# Patient Record
Sex: FEMALE | Race: WHITE | Employment: OTHER
[De-identification: names, ages, dates, MRNs, and addresses within clinical notes are randomized per-mention and may not be internally consistent; named-entity substitution may affect disease eponyms.]

---

## 2017-01-04 ENCOUNTER — OFFICE VISIT (OUTPATIENT)
Dept: WOUND CARE | Facility: CLINIC | Age: 67
End: 2017-01-04

## 2017-01-04 DIAGNOSIS — L03.115 CELLULITIS OF LEG WITHOUT FOOT, RIGHT: Primary | ICD-10-CM

## 2017-01-04 DIAGNOSIS — L49.1 DERMATITIS, EXFOLIATIVE FROM ERYTHEMATOUS CONDITION ON 10-19% BODY SURFACE: ICD-10-CM

## 2017-01-04 PROCEDURE — 99213 OFFICE O/P EST LOW 20 MIN: CPT | Performed by: PODIATRIST

## 2017-01-12 ENCOUNTER — TELEPHONE (OUTPATIENT)
Dept: WOUND CARE | Facility: CLINIC | Age: 67
End: 2017-01-12

## 2017-03-21 ENCOUNTER — HOSPITAL ENCOUNTER (OUTPATIENT)
Age: 67
Setting detail: SPECIMEN
Discharge: HOME | DRG: 603 | End: 2017-03-21
Payer: MEDICARE

## 2017-03-21 LAB
ALBUMIN SERPL-MCNC: 2.9 G/DL (ref 3.5–5.2)
ALBUMIN/GLOBULIN RATIO: 0.9 (ref 1–2.5)
ALP BLD-CCNC: 64 U/L (ref 35–104)
ALT SERPL-CCNC: 18 U/L (ref 5–33)
ANION GAP SERPL CALCULATED.3IONS-SCNC: 8 MMOL/L (ref 9–17)
AST SERPL-CCNC: 17 U/L
BILIRUB SERPL-MCNC: <0.1 MG/DL (ref 0.3–1.2)
BUN BLDV-MCNC: 27 MG/DL (ref 8–23)
BUN/CREAT BLD: 18 (ref 9–20)
CALCIUM SERPL-MCNC: 8.8 MG/DL (ref 8.6–10.4)
CHLORIDE BLD-SCNC: 104 MMOL/L (ref 98–107)
CO2: 29 MMOL/L (ref 20–31)
CREAT SERPL-MCNC: 1.49 MG/DL (ref 0.5–0.9)
GFR AFRICAN AMERICAN: 42 ML/MIN
GFR NON-AFRICAN AMERICAN: 35 ML/MIN
GFR SERPL CREATININE-BSD FRML MDRD: ABNORMAL ML/MIN/{1.73_M2}
GFR SERPL CREATININE-BSD FRML MDRD: ABNORMAL ML/MIN/{1.73_M2}
GLUCOSE BLD-MCNC: 101 MG/DL (ref 70–99)
POTASSIUM SERPL-SCNC: 5.2 MMOL/L (ref 3.7–5.3)
SODIUM BLD-SCNC: 141 MMOL/L (ref 135–144)
TOTAL PROTEIN: 6.2 G/DL (ref 6.4–8.3)

## 2017-03-21 PROCEDURE — 36415 COLL VENOUS BLD VENIPUNCTURE: CPT

## 2017-03-21 PROCEDURE — 80053 COMPREHEN METABOLIC PANEL: CPT

## 2017-03-21 PROCEDURE — P9604 ONE-WAY ALLOW PRORATED TRIP: HCPCS

## 2017-03-22 ENCOUNTER — HOSPITAL ENCOUNTER (OUTPATIENT)
Age: 67
Setting detail: SPECIMEN
Discharge: HOME | DRG: 603 | End: 2017-03-22
Payer: MEDICARE

## 2017-03-22 LAB
ANION GAP SERPL CALCULATED.3IONS-SCNC: 11 MMOL/L (ref 9–17)
BUN BLDV-MCNC: 26 MG/DL (ref 8–23)
BUN/CREAT BLD: 19 (ref 9–20)
CALCIUM SERPL-MCNC: 9.1 MG/DL (ref 8.6–10.4)
CHLORIDE BLD-SCNC: 99 MMOL/L (ref 98–107)
CO2: 28 MMOL/L (ref 20–31)
CREAT SERPL-MCNC: 1.38 MG/DL (ref 0.5–0.9)
GFR AFRICAN AMERICAN: 46 ML/MIN
GFR NON-AFRICAN AMERICAN: 38 ML/MIN
GFR SERPL CREATININE-BSD FRML MDRD: ABNORMAL ML/MIN/{1.73_M2}
GFR SERPL CREATININE-BSD FRML MDRD: ABNORMAL ML/MIN/{1.73_M2}
GLUCOSE BLD-MCNC: 106 MG/DL (ref 70–99)
POTASSIUM SERPL-SCNC: 5.4 MMOL/L (ref 3.7–5.3)
SODIUM BLD-SCNC: 138 MMOL/L (ref 135–144)

## 2017-03-22 PROCEDURE — 80048 BASIC METABOLIC PNL TOTAL CA: CPT

## 2017-03-22 PROCEDURE — P9604 ONE-WAY ALLOW PRORATED TRIP: HCPCS

## 2017-03-22 PROCEDURE — 36415 COLL VENOUS BLD VENIPUNCTURE: CPT

## 2017-03-23 ENCOUNTER — HOSPITAL ENCOUNTER (INPATIENT)
Age: 67
LOS: 4 days | Discharge: NURSING, REHAB OR LONG-TERM CARE | DRG: 603 | End: 2017-03-27
Attending: INTERNAL MEDICINE | Admitting: INTERNAL MEDICINE
Payer: MEDICARE

## 2017-03-23 PROBLEM — L03.115 CELLULITIS OF RIGHT LEG: Status: ACTIVE | Noted: 2017-03-23

## 2017-03-23 PROCEDURE — 1200000000 HC SEMI PRIVATE

## 2017-03-23 PROCEDURE — 87077 CULTURE AEROBIC IDENTIFY: CPT

## 2017-03-23 PROCEDURE — 6360000002 HC RX W HCPCS: Performed by: INTERNAL MEDICINE

## 2017-03-23 PROCEDURE — 6370000000 HC RX 637 (ALT 250 FOR IP): Performed by: INTERNAL MEDICINE

## 2017-03-23 PROCEDURE — 2500000003 HC RX 250 WO HCPCS: Performed by: INTERNAL MEDICINE

## 2017-03-23 PROCEDURE — 2580000003 HC RX 258: Performed by: INTERNAL MEDICINE

## 2017-03-23 PROCEDURE — 87186 SC STD MICRODIL/AGAR DIL: CPT

## 2017-03-23 PROCEDURE — 87070 CULTURE OTHR SPECIMN AEROBIC: CPT

## 2017-03-23 PROCEDURE — 86403 PARTICLE AGGLUT ANTBDY SCRN: CPT

## 2017-03-23 PROCEDURE — 36415 COLL VENOUS BLD VENIPUNCTURE: CPT

## 2017-03-23 PROCEDURE — 87205 SMEAR GRAM STAIN: CPT

## 2017-03-23 PROCEDURE — 87040 BLOOD CULTURE FOR BACTERIA: CPT

## 2017-03-23 RX ORDER — DEXTROMETHORPHAN POLISTIREX 30 MG/5ML
60 SUSPENSION ORAL 2 TIMES DAILY PRN
Status: DISCONTINUED | OUTPATIENT
Start: 2017-03-23 | End: 2017-03-27 | Stop reason: HOSPADM

## 2017-03-23 RX ORDER — CARVEDILOL 25 MG/1
25 TABLET ORAL 2 TIMES DAILY WITH MEALS
Status: DISCONTINUED | OUTPATIENT
Start: 2017-03-23 | End: 2017-03-27 | Stop reason: HOSPADM

## 2017-03-23 RX ORDER — MELOXICAM 7.5 MG/1
7.5 TABLET ORAL DAILY
Status: DISCONTINUED | OUTPATIENT
Start: 2017-03-24 | End: 2017-03-27 | Stop reason: HOSPADM

## 2017-03-23 RX ORDER — SODIUM PHOSPHATE, DIBASIC AND SODIUM PHOSPHATE, MONOBASIC 7; 19 G/133ML; G/133ML
1 ENEMA RECTAL
COMMUNITY
End: 2020-10-07

## 2017-03-23 RX ORDER — POTASSIUM CHLORIDE 750 MG/1
10 TABLET, FILM COATED, EXTENDED RELEASE ORAL 2 TIMES DAILY
Status: DISCONTINUED | OUTPATIENT
Start: 2017-03-24 | End: 2017-03-27 | Stop reason: HOSPADM

## 2017-03-23 RX ORDER — BISACODYL 10 MG
10 SUPPOSITORY, RECTAL RECTAL DAILY PRN
Status: DISCONTINUED | OUTPATIENT
Start: 2017-03-23 | End: 2017-03-27 | Stop reason: HOSPADM

## 2017-03-23 RX ORDER — OYSTER SHELL CALCIUM WITH VITAMIN D 500; 200 MG/1; [IU]/1
1 TABLET, FILM COATED ORAL DAILY
Status: DISCONTINUED | OUTPATIENT
Start: 2017-03-24 | End: 2017-03-27 | Stop reason: HOSPADM

## 2017-03-23 RX ORDER — ACETAMINOPHEN 325 MG/1
650 TABLET ORAL EVERY 4 HOURS PRN
Status: DISCONTINUED | OUTPATIENT
Start: 2017-03-23 | End: 2017-03-27 | Stop reason: HOSPADM

## 2017-03-23 RX ORDER — OLANZAPINE 2.5 MG/1
2.5 TABLET ORAL
Status: DISCONTINUED | OUTPATIENT
Start: 2017-03-23 | End: 2017-03-24

## 2017-03-23 RX ORDER — SIMETHICONE 80 MG
80 TABLET,CHEWABLE ORAL 2 TIMES DAILY
COMMUNITY

## 2017-03-23 RX ORDER — ONDANSETRON 2 MG/ML
4 INJECTION INTRAMUSCULAR; INTRAVENOUS EVERY 6 HOURS PRN
Status: DISCONTINUED | OUTPATIENT
Start: 2017-03-23 | End: 2017-03-27 | Stop reason: HOSPADM

## 2017-03-23 RX ORDER — SODIUM CHLORIDE 0.9 % (FLUSH) 0.9 %
10 SYRINGE (ML) INJECTION PRN
Status: DISCONTINUED | OUTPATIENT
Start: 2017-03-23 | End: 2017-03-27 | Stop reason: HOSPADM

## 2017-03-23 RX ORDER — FAMOTIDINE 20 MG/1
20 TABLET, FILM COATED ORAL 2 TIMES DAILY
Status: DISCONTINUED | OUTPATIENT
Start: 2017-03-23 | End: 2017-03-27 | Stop reason: HOSPADM

## 2017-03-23 RX ORDER — SODIUM CHLORIDE 9 MG/ML
INJECTION, SOLUTION INTRAVENOUS CONTINUOUS
Status: DISCONTINUED | OUTPATIENT
Start: 2017-03-23 | End: 2017-03-25

## 2017-03-23 RX ORDER — CARBAMAZEPINE 200 MG/1
200 TABLET ORAL DAILY
Status: DISCONTINUED | OUTPATIENT
Start: 2017-03-24 | End: 2017-03-27 | Stop reason: HOSPADM

## 2017-03-23 RX ORDER — CETIRIZINE HYDROCHLORIDE 10 MG/1
5 TABLET ORAL DAILY
Status: DISCONTINUED | OUTPATIENT
Start: 2017-03-24 | End: 2017-03-26

## 2017-03-23 RX ORDER — DIPHENHYDRAMINE HCL 25 MG
25 TABLET ORAL NIGHTLY PRN
COMMUNITY

## 2017-03-23 RX ORDER — ACETAMINOPHEN 325 MG/1
650 TABLET ORAL 2 TIMES DAILY
COMMUNITY

## 2017-03-23 RX ORDER — LAMOTRIGINE 25 MG/1
75 TABLET ORAL DAILY
Status: DISCONTINUED | OUTPATIENT
Start: 2017-03-24 | End: 2017-03-27 | Stop reason: HOSPADM

## 2017-03-23 RX ORDER — ATORVASTATIN CALCIUM 20 MG/1
20 TABLET, FILM COATED ORAL DAILY
Status: DISCONTINUED | OUTPATIENT
Start: 2017-03-24 | End: 2017-03-24

## 2017-03-23 RX ORDER — AMLODIPINE BESYLATE 10 MG/1
10 TABLET ORAL DAILY
Status: DISCONTINUED | OUTPATIENT
Start: 2017-03-24 | End: 2017-03-27 | Stop reason: HOSPADM

## 2017-03-23 RX ORDER — SULFAMETHOXAZOLE AND TRIMETHOPRIM 800; 160 MG/1; MG/1
1 TABLET ORAL 2 TIMES DAILY
Status: ON HOLD | COMMUNITY
End: 2017-03-27 | Stop reason: HOSPADM

## 2017-03-23 RX ORDER — SODIUM CHLORIDE 0.9 % (FLUSH) 0.9 %
10 SYRINGE (ML) INJECTION EVERY 12 HOURS SCHEDULED
Status: DISCONTINUED | OUTPATIENT
Start: 2017-03-23 | End: 2017-03-27 | Stop reason: HOSPADM

## 2017-03-23 RX ORDER — CARBAMAZEPINE 100 MG/1
100 TABLET, CHEWABLE ORAL DAILY
Status: DISCONTINUED | OUTPATIENT
Start: 2017-03-24 | End: 2017-03-24

## 2017-03-23 RX ORDER — LAMOTRIGINE 100 MG/1
100 TABLET ORAL DAILY
Status: DISCONTINUED | OUTPATIENT
Start: 2017-03-24 | End: 2017-03-24

## 2017-03-23 RX ORDER — LEVOTHYROXINE SODIUM 0.15 MG/1
150 TABLET ORAL DAILY
Status: DISCONTINUED | OUTPATIENT
Start: 2017-03-24 | End: 2017-03-27 | Stop reason: HOSPADM

## 2017-03-23 RX ORDER — LISINOPRIL AND HYDROCHLOROTHIAZIDE 20; 12.5 MG/1; MG/1
2 TABLET ORAL DAILY
Status: DISCONTINUED | OUTPATIENT
Start: 2017-03-24 | End: 2017-03-27 | Stop reason: HOSPADM

## 2017-03-23 RX ORDER — VENLAFAXINE 75 MG/1
75 TABLET ORAL DAILY
Status: DISCONTINUED | OUTPATIENT
Start: 2017-03-24 | End: 2017-03-27 | Stop reason: HOSPADM

## 2017-03-23 RX ORDER — ACETAMINOPHEN 500 MG
1000 TABLET ORAL EVERY 6 HOURS PRN
Status: ON HOLD | COMMUNITY
End: 2017-03-27 | Stop reason: HOSPADM

## 2017-03-23 RX ORDER — CLINDAMYCIN PHOSPHATE 600 MG/50ML
600 INJECTION INTRAVENOUS EVERY 8 HOURS
Status: DISCONTINUED | OUTPATIENT
Start: 2017-03-23 | End: 2017-03-26

## 2017-03-23 RX ORDER — FUROSEMIDE 20 MG/1
20 TABLET ORAL EVERY OTHER DAY
Status: DISCONTINUED | OUTPATIENT
Start: 2017-03-24 | End: 2017-03-27 | Stop reason: HOSPADM

## 2017-03-23 RX ADMIN — ACETAMINOPHEN 650 MG: 325 TABLET, FILM COATED ORAL at 19:05

## 2017-03-23 RX ADMIN — VANCOMYCIN HYDROCHLORIDE 1500 MG: 1 INJECTION, POWDER, LYOPHILIZED, FOR SOLUTION INTRAVENOUS at 23:50

## 2017-03-23 RX ADMIN — ACETAMINOPHEN 650 MG: 325 TABLET, FILM COATED ORAL at 22:57

## 2017-03-23 RX ADMIN — ENOXAPARIN SODIUM 40 MG: 40 INJECTION SUBCUTANEOUS at 16:45

## 2017-03-23 RX ADMIN — FAMOTIDINE 20 MG: 20 TABLET ORAL at 21:00

## 2017-03-23 RX ADMIN — CLINDAMYCIN PHOSPHATE 600 MG: 12 INJECTION, SOLUTION INTRAMUSCULAR; INTRAVENOUS at 19:07

## 2017-03-23 RX ADMIN — VANCOMYCIN HYDROCHLORIDE 1500 MG: 1 INJECTION, POWDER, LYOPHILIZED, FOR SOLUTION INTRAVENOUS at 16:44

## 2017-03-23 RX ADMIN — CARVEDILOL 25 MG: 25 TABLET, FILM COATED ORAL at 16:45

## 2017-03-23 RX ADMIN — SODIUM CHLORIDE: 9 INJECTION, SOLUTION INTRAVENOUS at 16:42

## 2017-03-23 ASSESSMENT — PAIN SCALES - GENERAL
PAINLEVEL_OUTOF10: 8
PAINLEVEL_OUTOF10: 3
PAINLEVEL_OUTOF10: 7

## 2017-03-23 ASSESSMENT — PAIN DESCRIPTION - LOCATION
LOCATION: HIP
LOCATION: HIP

## 2017-03-23 ASSESSMENT — PAIN DESCRIPTION - DESCRIPTORS
DESCRIPTORS: ACHING
DESCRIPTORS: ACHING

## 2017-03-23 ASSESSMENT — PAIN DESCRIPTION - ORIENTATION
ORIENTATION: RIGHT
ORIENTATION: RIGHT

## 2017-03-23 ASSESSMENT — PAIN DESCRIPTION - FREQUENCY: FREQUENCY: CONTINUOUS

## 2017-03-23 ASSESSMENT — PAIN DESCRIPTION - PAIN TYPE: TYPE: ACUTE PAIN

## 2017-03-24 LAB
ABSOLUTE EOS #: 0.3 K/UL (ref 0–0.4)
ABSOLUTE LYMPH #: 1 K/UL (ref 1–4.8)
ABSOLUTE MONO #: 0.7 K/UL (ref 0–1)
ANION GAP SERPL CALCULATED.3IONS-SCNC: 11 MMOL/L (ref 9–17)
BASOPHILS # BLD: 0 % (ref 0–2)
BASOPHILS ABSOLUTE: 0 K/UL (ref 0–0.2)
BUN BLDV-MCNC: 31 MG/DL (ref 8–23)
BUN/CREAT BLD: 22 (ref 9–20)
CALCIUM SERPL-MCNC: 8.9 MG/DL (ref 8.6–10.4)
CHLORIDE BLD-SCNC: 102 MMOL/L (ref 98–107)
CO2: 26 MMOL/L (ref 20–31)
CREAT SERPL-MCNC: 1.38 MG/DL (ref 0.5–0.9)
DIFFERENTIAL TYPE: ABNORMAL
EOSINOPHILS RELATIVE PERCENT: 4 % (ref 0–8)
GFR AFRICAN AMERICAN: 46 ML/MIN
GFR NON-AFRICAN AMERICAN: 38 ML/MIN
GFR SERPL CREATININE-BSD FRML MDRD: ABNORMAL ML/MIN/{1.73_M2}
GFR SERPL CREATININE-BSD FRML MDRD: ABNORMAL ML/MIN/{1.73_M2}
GLUCOSE BLD-MCNC: 116 MG/DL (ref 70–99)
HCT VFR BLD CALC: 27.6 % (ref 36–46)
HEMOGLOBIN: 9 G/DL (ref 12–16)
LYMPHOCYTES # BLD: 12 % (ref 24–44)
MCH RBC QN AUTO: 31.6 PG (ref 26–34)
MCHC RBC AUTO-ENTMCNC: 32.7 G/DL (ref 31–37)
MCV RBC AUTO: 96.5 FL (ref 80–100)
MONOCYTES # BLD: 9 % (ref 0–12)
PDW BLD-RTO: 14.5 % (ref 12.1–15.2)
PLATELET # BLD: 320 K/UL (ref 140–450)
PLATELET ESTIMATE: ABNORMAL
PMV BLD AUTO: 8.4 FL (ref 6–12)
POTASSIUM SERPL-SCNC: 4.8 MMOL/L (ref 3.7–5.3)
RBC # BLD: 2.86 M/UL (ref 4–5.2)
RBC # BLD: ABNORMAL 10*6/UL
SEG NEUTROPHILS: 75 % (ref 36–66)
SEGMENTED NEUTROPHILS ABSOLUTE COUNT: 6.3 K/UL (ref 1.8–7.7)
SODIUM BLD-SCNC: 139 MMOL/L (ref 135–144)
WBC # BLD: 8.4 K/UL (ref 3.5–11)
WBC # BLD: ABNORMAL 10*3/UL

## 2017-03-24 PROCEDURE — 6370000000 HC RX 637 (ALT 250 FOR IP): Performed by: INTERNAL MEDICINE

## 2017-03-24 PROCEDURE — 85025 COMPLETE CBC W/AUTO DIFF WBC: CPT

## 2017-03-24 PROCEDURE — 80048 BASIC METABOLIC PNL TOTAL CA: CPT

## 2017-03-24 PROCEDURE — 2580000003 HC RX 258: Performed by: INTERNAL MEDICINE

## 2017-03-24 PROCEDURE — 6360000002 HC RX W HCPCS: Performed by: INTERNAL MEDICINE

## 2017-03-24 PROCEDURE — 1200000000 HC SEMI PRIVATE

## 2017-03-24 PROCEDURE — 36415 COLL VENOUS BLD VENIPUNCTURE: CPT

## 2017-03-24 PROCEDURE — 2500000003 HC RX 250 WO HCPCS: Performed by: INTERNAL MEDICINE

## 2017-03-24 RX ORDER — VENLAFAXINE 37.5 MG/1
37.5 TABLET ORAL NIGHTLY
Status: DISCONTINUED | OUTPATIENT
Start: 2017-03-24 | End: 2017-03-27 | Stop reason: HOSPADM

## 2017-03-24 RX ORDER — ATORVASTATIN CALCIUM 20 MG/1
20 TABLET, FILM COATED ORAL NIGHTLY
Status: DISCONTINUED | OUTPATIENT
Start: 2017-03-24 | End: 2017-03-27 | Stop reason: HOSPADM

## 2017-03-24 RX ORDER — CARBAMAZEPINE 100 MG/1
100 TABLET, CHEWABLE ORAL NIGHTLY
Status: DISCONTINUED | OUTPATIENT
Start: 2017-03-24 | End: 2017-03-27 | Stop reason: HOSPADM

## 2017-03-24 RX ORDER — LAMOTRIGINE 100 MG/1
100 TABLET ORAL NIGHTLY
Status: DISCONTINUED | OUTPATIENT
Start: 2017-03-24 | End: 2017-03-27 | Stop reason: HOSPADM

## 2017-03-24 RX ORDER — OLANZAPINE 2.5 MG/1
2.5 TABLET ORAL
Status: DISCONTINUED | OUTPATIENT
Start: 2017-03-24 | End: 2017-03-27 | Stop reason: HOSPADM

## 2017-03-24 RX ADMIN — LISINOPRIL AND HYDROCHLOROTHIAZIDE 2 TABLET: 12.5; 2 TABLET ORAL at 09:39

## 2017-03-24 RX ADMIN — FAMOTIDINE 20 MG: 20 TABLET ORAL at 21:00

## 2017-03-24 RX ADMIN — CLINDAMYCIN PHOSPHATE 600 MG: 12 INJECTION, SOLUTION INTRAMUSCULAR; INTRAVENOUS at 02:00

## 2017-03-24 RX ADMIN — VENLAFAXINE 75 MG: 75 TABLET ORAL at 09:40

## 2017-03-24 RX ADMIN — OLANZAPINE 2.5 MG: 2.5 TABLET, FILM COATED ORAL at 09:44

## 2017-03-24 RX ADMIN — ACETAMINOPHEN 650 MG: 325 TABLET, FILM COATED ORAL at 05:41

## 2017-03-24 RX ADMIN — ATORVASTATIN CALCIUM 20 MG: 20 TABLET, FILM COATED ORAL at 21:00

## 2017-03-24 RX ADMIN — CALCIUM CARBONATE-VITAMIN D TAB 500 MG-200 UNIT 1 TABLET: 500-200 TAB at 09:39

## 2017-03-24 RX ADMIN — VANCOMYCIN HYDROCHLORIDE 1500 MG: 1 INJECTION, POWDER, LYOPHILIZED, FOR SOLUTION INTRAVENOUS at 11:02

## 2017-03-24 RX ADMIN — ACETAMINOPHEN 650 MG: 325 TABLET, FILM COATED ORAL at 21:00

## 2017-03-24 RX ADMIN — MELOXICAM 7.5 MG: 7.5 TABLET ORAL at 10:02

## 2017-03-24 RX ADMIN — VANCOMYCIN HYDROCHLORIDE 1500 MG: 1 INJECTION, POWDER, LYOPHILIZED, FOR SOLUTION INTRAVENOUS at 23:18

## 2017-03-24 RX ADMIN — ENOXAPARIN SODIUM 40 MG: 40 INJECTION SUBCUTANEOUS at 09:43

## 2017-03-24 RX ADMIN — CETIRIZINE HYDROCHLORIDE 5 MG: 10 TABLET, FILM COATED ORAL at 09:39

## 2017-03-24 RX ADMIN — POTASSIUM CHLORIDE 10 MEQ: 750 TABLET, FILM COATED, EXTENDED RELEASE ORAL at 09:39

## 2017-03-24 RX ADMIN — SODIUM CHLORIDE: 9 INJECTION, SOLUTION INTRAVENOUS at 16:31

## 2017-03-24 RX ADMIN — CARVEDILOL 25 MG: 25 TABLET, FILM COATED ORAL at 07:44

## 2017-03-24 RX ADMIN — CLINDAMYCIN PHOSPHATE 600 MG: 12 INJECTION, SOLUTION INTRAMUSCULAR; INTRAVENOUS at 09:43

## 2017-03-24 RX ADMIN — FUROSEMIDE 20 MG: 20 TABLET ORAL at 09:39

## 2017-03-24 RX ADMIN — CARVEDILOL 25 MG: 25 TABLET, FILM COATED ORAL at 16:32

## 2017-03-24 RX ADMIN — LAMOTRIGINE 75 MG: 25 TABLET ORAL at 09:39

## 2017-03-24 RX ADMIN — CLINDAMYCIN PHOSPHATE 600 MG: 12 INJECTION, SOLUTION INTRAMUSCULAR; INTRAVENOUS at 16:32

## 2017-03-24 RX ADMIN — LEVOTHYROXINE SODIUM 150 MCG: 150 TABLET ORAL at 07:40

## 2017-03-24 RX ADMIN — POTASSIUM CHLORIDE 10 MEQ: 750 TABLET, FILM COATED, EXTENDED RELEASE ORAL at 21:00

## 2017-03-24 RX ADMIN — LAMOTRIGINE 100 MG: 100 TABLET ORAL at 21:00

## 2017-03-24 RX ADMIN — CARBAMAZEPINE 200 MG: 200 TABLET ORAL at 09:40

## 2017-03-24 RX ADMIN — FAMOTIDINE 20 MG: 20 TABLET ORAL at 09:40

## 2017-03-24 RX ADMIN — CARBAMAZEPINE 100 MG: 100 TABLET, CHEWABLE ORAL at 21:00

## 2017-03-24 RX ADMIN — ACETAMINOPHEN 650 MG: 325 TABLET, FILM COATED ORAL at 09:36

## 2017-03-24 RX ADMIN — AMLODIPINE BESYLATE 10 MG: 10 TABLET ORAL at 09:39

## 2017-03-24 RX ADMIN — VENLAFAXINE 37.5 MG: 37.5 TABLET ORAL at 21:00

## 2017-03-24 ASSESSMENT — PAIN DESCRIPTION - LOCATION
LOCATION: HIP
LOCATION: LEG

## 2017-03-24 ASSESSMENT — PAIN SCALES - GENERAL
PAINLEVEL_OUTOF10: 7
PAINLEVEL_OUTOF10: 9
PAINLEVEL_OUTOF10: 7
PAINLEVEL_OUTOF10: 0

## 2017-03-24 ASSESSMENT — PAIN DESCRIPTION - ORIENTATION
ORIENTATION: RIGHT
ORIENTATION: RIGHT

## 2017-03-24 ASSESSMENT — PAIN DESCRIPTION - PAIN TYPE
TYPE: ACUTE PAIN
TYPE: ACUTE PAIN

## 2017-03-25 LAB
ABSOLUTE EOS #: 0.4 K/UL (ref 0–0.4)
ABSOLUTE LYMPH #: 0.9 K/UL (ref 1–4.8)
ABSOLUTE MONO #: 0.6 K/UL (ref 0–1)
ANION GAP SERPL CALCULATED.3IONS-SCNC: 9 MMOL/L (ref 9–17)
BASOPHILS # BLD: 0 % (ref 0–2)
BASOPHILS ABSOLUTE: 0 K/UL (ref 0–0.2)
BUN BLDV-MCNC: 25 MG/DL (ref 8–23)
BUN/CREAT BLD: 22 (ref 9–20)
CALCIUM SERPL-MCNC: 8.5 MG/DL (ref 8.6–10.4)
CHLORIDE BLD-SCNC: 101 MMOL/L (ref 98–107)
CO2: 28 MMOL/L (ref 20–31)
CREAT SERPL-MCNC: 1.16 MG/DL (ref 0.5–0.9)
DIFFERENTIAL TYPE: ABNORMAL
EOSINOPHILS RELATIVE PERCENT: 5 % (ref 0–8)
GFR AFRICAN AMERICAN: 56 ML/MIN
GFR NON-AFRICAN AMERICAN: 46 ML/MIN
GFR SERPL CREATININE-BSD FRML MDRD: ABNORMAL ML/MIN/{1.73_M2}
GFR SERPL CREATININE-BSD FRML MDRD: ABNORMAL ML/MIN/{1.73_M2}
GLUCOSE BLD-MCNC: 100 MG/DL (ref 74–100)
GLUCOSE BLD-MCNC: 119 MG/DL (ref 70–99)
HCT VFR BLD CALC: 28.2 % (ref 36–46)
HEMOGLOBIN: 9.2 G/DL (ref 12–16)
LYMPHOCYTES # BLD: 12 % (ref 24–44)
MCH RBC QN AUTO: 31.5 PG (ref 26–34)
MCHC RBC AUTO-ENTMCNC: 32.6 G/DL (ref 31–37)
MCV RBC AUTO: 96.8 FL (ref 80–100)
MONOCYTES # BLD: 8 % (ref 0–12)
PDW BLD-RTO: 14.8 % (ref 12.1–15.2)
PLATELET # BLD: 310 K/UL (ref 140–450)
PLATELET ESTIMATE: ABNORMAL
PMV BLD AUTO: 9 FL (ref 6–12)
POTASSIUM SERPL-SCNC: 4.8 MMOL/L (ref 3.7–5.3)
RBC # BLD: 2.91 M/UL (ref 4–5.2)
RBC # BLD: ABNORMAL 10*6/UL
SEG NEUTROPHILS: 75 % (ref 36–66)
SEGMENTED NEUTROPHILS ABSOLUTE COUNT: 5.6 K/UL (ref 1.8–7.7)
SODIUM BLD-SCNC: 138 MMOL/L (ref 135–144)
VANCOMYCIN TROUGH DATE LAST DOSE: ABNORMAL
VANCOMYCIN TROUGH DOSE AMOUNT: ABNORMAL
VANCOMYCIN TROUGH TIME LAST DOSE: ABNORMAL
VANCOMYCIN TROUGH: 28.2 UG/ML (ref 10–20)
WBC # BLD: 7.5 K/UL (ref 3.5–11)
WBC # BLD: ABNORMAL 10*3/UL

## 2017-03-25 PROCEDURE — 85025 COMPLETE CBC W/AUTO DIFF WBC: CPT

## 2017-03-25 PROCEDURE — 80048 BASIC METABOLIC PNL TOTAL CA: CPT

## 2017-03-25 PROCEDURE — 6370000000 HC RX 637 (ALT 250 FOR IP): Performed by: INTERNAL MEDICINE

## 2017-03-25 PROCEDURE — 2500000003 HC RX 250 WO HCPCS: Performed by: INTERNAL MEDICINE

## 2017-03-25 PROCEDURE — 6370000000 HC RX 637 (ALT 250 FOR IP): Performed by: FAMILY MEDICINE

## 2017-03-25 PROCEDURE — 1200000000 HC SEMI PRIVATE

## 2017-03-25 PROCEDURE — 80202 ASSAY OF VANCOMYCIN: CPT

## 2017-03-25 PROCEDURE — 2580000003 HC RX 258: Performed by: INTERNAL MEDICINE

## 2017-03-25 PROCEDURE — 36415 COLL VENOUS BLD VENIPUNCTURE: CPT

## 2017-03-25 PROCEDURE — 6360000002 HC RX W HCPCS: Performed by: INTERNAL MEDICINE

## 2017-03-25 PROCEDURE — 82947 ASSAY GLUCOSE BLOOD QUANT: CPT

## 2017-03-25 RX ORDER — HYDROCODONE BITARTRATE AND ACETAMINOPHEN 5; 325 MG/1; MG/1
1 TABLET ORAL EVERY 6 HOURS PRN
Status: DISCONTINUED | OUTPATIENT
Start: 2017-03-25 | End: 2017-03-27 | Stop reason: HOSPADM

## 2017-03-25 RX ADMIN — CALCIUM CARBONATE-VITAMIN D TAB 500 MG-200 UNIT 1 TABLET: 500-200 TAB at 08:20

## 2017-03-25 RX ADMIN — LISINOPRIL AND HYDROCHLOROTHIAZIDE 2 TABLET: 12.5; 2 TABLET ORAL at 08:20

## 2017-03-25 RX ADMIN — CLINDAMYCIN PHOSPHATE 600 MG: 12 INJECTION, SOLUTION INTRAMUSCULAR; INTRAVENOUS at 09:38

## 2017-03-25 RX ADMIN — CARBAMAZEPINE 100 MG: 100 TABLET, CHEWABLE ORAL at 20:24

## 2017-03-25 RX ADMIN — CARBAMAZEPINE 200 MG: 200 TABLET ORAL at 08:20

## 2017-03-25 RX ADMIN — POTASSIUM CHLORIDE 10 MEQ: 750 TABLET, FILM COATED, EXTENDED RELEASE ORAL at 20:24

## 2017-03-25 RX ADMIN — ACETAMINOPHEN 650 MG: 325 TABLET, FILM COATED ORAL at 00:49

## 2017-03-25 RX ADMIN — CLINDAMYCIN PHOSPHATE 600 MG: 12 INJECTION, SOLUTION INTRAMUSCULAR; INTRAVENOUS at 02:30

## 2017-03-25 RX ADMIN — Medication 10 ML: at 20:27

## 2017-03-25 RX ADMIN — POTASSIUM CHLORIDE 10 MEQ: 750 TABLET, FILM COATED, EXTENDED RELEASE ORAL at 08:20

## 2017-03-25 RX ADMIN — HYDROCODONE BITARTRATE AND ACETAMINOPHEN 1 TABLET: 5; 325 TABLET ORAL at 20:24

## 2017-03-25 RX ADMIN — ATORVASTATIN CALCIUM 20 MG: 20 TABLET, FILM COATED ORAL at 20:24

## 2017-03-25 RX ADMIN — LAMOTRIGINE 75 MG: 25 TABLET ORAL at 08:20

## 2017-03-25 RX ADMIN — AMLODIPINE BESYLATE 10 MG: 10 TABLET ORAL at 08:20

## 2017-03-25 RX ADMIN — CARVEDILOL 25 MG: 25 TABLET, FILM COATED ORAL at 08:20

## 2017-03-25 RX ADMIN — ENOXAPARIN SODIUM 40 MG: 40 INJECTION SUBCUTANEOUS at 08:20

## 2017-03-25 RX ADMIN — MELOXICAM 7.5 MG: 7.5 TABLET ORAL at 08:20

## 2017-03-25 RX ADMIN — VENLAFAXINE 75 MG: 75 TABLET ORAL at 08:20

## 2017-03-25 RX ADMIN — CLINDAMYCIN PHOSPHATE 600 MG: 12 INJECTION, SOLUTION INTRAMUSCULAR; INTRAVENOUS at 17:09

## 2017-03-25 RX ADMIN — FAMOTIDINE 20 MG: 20 TABLET ORAL at 20:24

## 2017-03-25 RX ADMIN — CETIRIZINE HYDROCHLORIDE 5 MG: 10 TABLET, FILM COATED ORAL at 08:20

## 2017-03-25 RX ADMIN — ACETAMINOPHEN 650 MG: 325 TABLET, FILM COATED ORAL at 05:07

## 2017-03-25 RX ADMIN — LAMOTRIGINE 100 MG: 100 TABLET ORAL at 20:24

## 2017-03-25 RX ADMIN — CARVEDILOL 25 MG: 25 TABLET, FILM COATED ORAL at 17:09

## 2017-03-25 RX ADMIN — FAMOTIDINE 20 MG: 20 TABLET ORAL at 08:20

## 2017-03-25 RX ADMIN — LEVOTHYROXINE SODIUM 150 MCG: 150 TABLET ORAL at 06:50

## 2017-03-25 RX ADMIN — VENLAFAXINE 37.5 MG: 37.5 TABLET ORAL at 20:24

## 2017-03-25 ASSESSMENT — PAIN DESCRIPTION - PAIN TYPE
TYPE: ACUTE PAIN
TYPE: ACUTE PAIN

## 2017-03-25 ASSESSMENT — PAIN SCALES - GENERAL
PAINLEVEL_OUTOF10: 0
PAINLEVEL_OUTOF10: 0
PAINLEVEL_OUTOF10: 9
PAINLEVEL_OUTOF10: 1
PAINLEVEL_OUTOF10: 6
PAINLEVEL_OUTOF10: 6

## 2017-03-25 ASSESSMENT — PAIN DESCRIPTION - LOCATION
LOCATION: HIP
LOCATION: HIP

## 2017-03-25 ASSESSMENT — PAIN DESCRIPTION - ORIENTATION
ORIENTATION: RIGHT;LEFT
ORIENTATION: RIGHT

## 2017-03-26 PROBLEM — A49.8 BACTERIAL INFECTION DUE TO PSEUDOMONAS: Status: ACTIVE | Noted: 2017-03-26

## 2017-03-26 PROBLEM — A49.01 METHICILLIN SUSCEPTIBLE STAPHYLOCOCCUS AUREUS INFECTION: Status: ACTIVE | Noted: 2017-03-26

## 2017-03-26 LAB
ABSOLUTE EOS #: 0.5 K/UL (ref 0–0.4)
ABSOLUTE LYMPH #: 1 K/UL (ref 1–4.8)
ABSOLUTE MONO #: 0.6 K/UL (ref 0–1)
ANION GAP SERPL CALCULATED.3IONS-SCNC: 9 MMOL/L (ref 9–17)
BASOPHILS # BLD: 0 % (ref 0–2)
BASOPHILS ABSOLUTE: 0 K/UL (ref 0–0.2)
BUN BLDV-MCNC: 22 MG/DL (ref 8–23)
BUN/CREAT BLD: 22 (ref 9–20)
CALCIUM SERPL-MCNC: 8.6 MG/DL (ref 8.6–10.4)
CHLORIDE BLD-SCNC: 105 MMOL/L (ref 98–107)
CO2: 27 MMOL/L (ref 20–31)
CREAT SERPL-MCNC: 1.02 MG/DL (ref 0.5–0.9)
CULTURE: ABNORMAL
DIFFERENTIAL TYPE: ABNORMAL
DIRECT EXAM: ABNORMAL
DIRECT EXAM: ABNORMAL
EOSINOPHILS RELATIVE PERCENT: 6 % (ref 0–8)
GFR AFRICAN AMERICAN: >60 ML/MIN
GFR NON-AFRICAN AMERICAN: 54 ML/MIN
GFR SERPL CREATININE-BSD FRML MDRD: ABNORMAL ML/MIN/{1.73_M2}
GFR SERPL CREATININE-BSD FRML MDRD: ABNORMAL ML/MIN/{1.73_M2}
GLUCOSE BLD-MCNC: 107 MG/DL (ref 74–100)
GLUCOSE BLD-MCNC: 119 MG/DL (ref 70–99)
HCT VFR BLD CALC: 27.8 % (ref 36–46)
HEMOGLOBIN: 9 G/DL (ref 12–16)
LYMPHOCYTES # BLD: 13 % (ref 24–44)
Lab: ABNORMAL
Lab: ABNORMAL
MCH RBC QN AUTO: 31.5 PG (ref 26–34)
MCHC RBC AUTO-ENTMCNC: 32.4 G/DL (ref 31–37)
MCV RBC AUTO: 97.2 FL (ref 80–100)
MONOCYTES # BLD: 8 % (ref 0–12)
ORGANISM: ABNORMAL
ORGANISM: ABNORMAL
PDW BLD-RTO: 15.1 % (ref 12.1–15.2)
PLATELET # BLD: 320 K/UL (ref 140–450)
PLATELET ESTIMATE: ABNORMAL
PMV BLD AUTO: 8.6 FL (ref 6–12)
POTASSIUM SERPL-SCNC: 4.9 MMOL/L (ref 3.7–5.3)
RBC # BLD: 2.86 M/UL (ref 4–5.2)
RBC # BLD: ABNORMAL 10*6/UL
SEG NEUTROPHILS: 73 % (ref 36–66)
SEGMENTED NEUTROPHILS ABSOLUTE COUNT: 5.3 K/UL (ref 1.8–7.7)
SODIUM BLD-SCNC: 141 MMOL/L (ref 135–144)
SPECIMEN DESCRIPTION: ABNORMAL
SPECIMEN DESCRIPTION: ABNORMAL
STATUS: ABNORMAL
WBC # BLD: 7.3 K/UL (ref 3.5–11)
WBC # BLD: ABNORMAL 10*3/UL

## 2017-03-26 PROCEDURE — 1200000000 HC SEMI PRIVATE

## 2017-03-26 PROCEDURE — 6370000000 HC RX 637 (ALT 250 FOR IP): Performed by: FAMILY MEDICINE

## 2017-03-26 PROCEDURE — 2580000003 HC RX 258: Performed by: INTERNAL MEDICINE

## 2017-03-26 PROCEDURE — 6360000002 HC RX W HCPCS: Performed by: INTERNAL MEDICINE

## 2017-03-26 PROCEDURE — 80048 BASIC METABOLIC PNL TOTAL CA: CPT

## 2017-03-26 PROCEDURE — 85025 COMPLETE CBC W/AUTO DIFF WBC: CPT

## 2017-03-26 PROCEDURE — 6370000000 HC RX 637 (ALT 250 FOR IP): Performed by: INTERNAL MEDICINE

## 2017-03-26 PROCEDURE — 82947 ASSAY GLUCOSE BLOOD QUANT: CPT

## 2017-03-26 PROCEDURE — 6360000002 HC RX W HCPCS: Performed by: PODIATRIST

## 2017-03-26 PROCEDURE — 2500000003 HC RX 250 WO HCPCS: Performed by: INTERNAL MEDICINE

## 2017-03-26 PROCEDURE — 36415 COLL VENOUS BLD VENIPUNCTURE: CPT

## 2017-03-26 PROCEDURE — 99221 1ST HOSP IP/OBS SF/LOW 40: CPT | Performed by: PODIATRIST

## 2017-03-26 RX ORDER — LEVOFLOXACIN 5 MG/ML
500 INJECTION, SOLUTION INTRAVENOUS EVERY 24 HOURS
Status: DISCONTINUED | OUTPATIENT
Start: 2017-03-26 | End: 2017-03-27

## 2017-03-26 RX ORDER — CETIRIZINE HYDROCHLORIDE 10 MG/1
10 TABLET ORAL DAILY
Status: DISCONTINUED | OUTPATIENT
Start: 2017-03-27 | End: 2017-03-27 | Stop reason: HOSPADM

## 2017-03-26 RX ADMIN — AMLODIPINE BESYLATE 10 MG: 10 TABLET ORAL at 07:54

## 2017-03-26 RX ADMIN — ENOXAPARIN SODIUM 40 MG: 40 INJECTION SUBCUTANEOUS at 07:54

## 2017-03-26 RX ADMIN — HYDROCODONE BITARTRATE AND ACETAMINOPHEN 1 TABLET: 5; 325 TABLET ORAL at 03:07

## 2017-03-26 RX ADMIN — CARVEDILOL 25 MG: 25 TABLET, FILM COATED ORAL at 16:55

## 2017-03-26 RX ADMIN — POTASSIUM CHLORIDE 10 MEQ: 750 TABLET, FILM COATED, EXTENDED RELEASE ORAL at 07:55

## 2017-03-26 RX ADMIN — LAMOTRIGINE 100 MG: 100 TABLET ORAL at 21:09

## 2017-03-26 RX ADMIN — VENLAFAXINE 37.5 MG: 37.5 TABLET ORAL at 21:09

## 2017-03-26 RX ADMIN — Medication 10 ML: at 21:11

## 2017-03-26 RX ADMIN — POTASSIUM CHLORIDE 10 MEQ: 750 TABLET, FILM COATED, EXTENDED RELEASE ORAL at 21:09

## 2017-03-26 RX ADMIN — FAMOTIDINE 20 MG: 20 TABLET ORAL at 07:55

## 2017-03-26 RX ADMIN — CETIRIZINE HYDROCHLORIDE 5 MG: 10 TABLET, FILM COATED ORAL at 07:54

## 2017-03-26 RX ADMIN — CARBAMAZEPINE 100 MG: 100 TABLET, CHEWABLE ORAL at 21:09

## 2017-03-26 RX ADMIN — CARBAMAZEPINE 200 MG: 200 TABLET ORAL at 07:55

## 2017-03-26 RX ADMIN — FAMOTIDINE 20 MG: 20 TABLET ORAL at 21:09

## 2017-03-26 RX ADMIN — VANCOMYCIN HYDROCHLORIDE 1500 MG: 1 INJECTION, POWDER, LYOPHILIZED, FOR SOLUTION INTRAVENOUS at 00:01

## 2017-03-26 RX ADMIN — LISINOPRIL AND HYDROCHLOROTHIAZIDE 2 TABLET: 12.5; 2 TABLET ORAL at 07:54

## 2017-03-26 RX ADMIN — LEVOFLOXACIN 500 MG: 5 INJECTION, SOLUTION INTRAVENOUS at 13:14

## 2017-03-26 RX ADMIN — ATORVASTATIN CALCIUM 20 MG: 20 TABLET, FILM COATED ORAL at 21:09

## 2017-03-26 RX ADMIN — CARVEDILOL 25 MG: 25 TABLET, FILM COATED ORAL at 07:54

## 2017-03-26 RX ADMIN — CLINDAMYCIN PHOSPHATE 600 MG: 12 INJECTION, SOLUTION INTRAMUSCULAR; INTRAVENOUS at 03:27

## 2017-03-26 RX ADMIN — VENLAFAXINE 75 MG: 75 TABLET ORAL at 07:55

## 2017-03-26 RX ADMIN — Medication 10 ML: at 07:55

## 2017-03-26 RX ADMIN — MELOXICAM 7.5 MG: 7.5 TABLET ORAL at 07:54

## 2017-03-26 RX ADMIN — CALCIUM CARBONATE-VITAMIN D TAB 500 MG-200 UNIT 1 TABLET: 500-200 TAB at 07:54

## 2017-03-26 RX ADMIN — FUROSEMIDE 20 MG: 20 TABLET ORAL at 07:54

## 2017-03-26 RX ADMIN — HYDROCODONE BITARTRATE AND ACETAMINOPHEN 1 TABLET: 5; 325 TABLET ORAL at 12:41

## 2017-03-26 RX ADMIN — LAMOTRIGINE 75 MG: 25 TABLET ORAL at 07:54

## 2017-03-26 RX ADMIN — LEVOTHYROXINE SODIUM 150 MCG: 150 TABLET ORAL at 07:55

## 2017-03-26 RX ADMIN — Medication 10 ML: at 13:14

## 2017-03-26 RX ADMIN — HYDROCODONE BITARTRATE AND ACETAMINOPHEN 1 TABLET: 5; 325 TABLET ORAL at 23:43

## 2017-03-26 ASSESSMENT — PAIN SCALES - GENERAL
PAINLEVEL_OUTOF10: 6
PAINLEVEL_OUTOF10: 6
PAINLEVEL_OUTOF10: 0
PAINLEVEL_OUTOF10: 0

## 2017-03-26 ASSESSMENT — PAIN DESCRIPTION - ORIENTATION: ORIENTATION: RIGHT;LEFT

## 2017-03-26 ASSESSMENT — PAIN DESCRIPTION - PAIN TYPE: TYPE: ACUTE PAIN

## 2017-03-26 ASSESSMENT — PAIN DESCRIPTION - LOCATION: LOCATION: LEG

## 2017-03-27 VITALS
SYSTOLIC BLOOD PRESSURE: 124 MMHG | DIASTOLIC BLOOD PRESSURE: 61 MMHG | HEIGHT: 64 IN | OXYGEN SATURATION: 95 % | BODY MASS INDEX: 45.87 KG/M2 | RESPIRATION RATE: 18 BRPM | WEIGHT: 268.7 LBS | TEMPERATURE: 97.1 F | HEART RATE: 81 BPM

## 2017-03-27 PROBLEM — E66.01 MORBID OBESITY (HCC): Chronic | Status: ACTIVE | Noted: 2017-03-27

## 2017-03-27 LAB
ABSOLUTE EOS #: 0.5 K/UL (ref 0–0.4)
ABSOLUTE LYMPH #: 1 K/UL (ref 1–4.8)
ABSOLUTE MONO #: 0.6 K/UL (ref 0–1)
ANION GAP SERPL CALCULATED.3IONS-SCNC: 10 MMOL/L (ref 9–17)
BASOPHILS # BLD: 0 % (ref 0–2)
BASOPHILS ABSOLUTE: 0 K/UL (ref 0–0.2)
BUN BLDV-MCNC: 21 MG/DL (ref 8–23)
BUN/CREAT BLD: 19 (ref 9–20)
CALCIUM SERPL-MCNC: 8.8 MG/DL (ref 8.6–10.4)
CHLORIDE BLD-SCNC: 104 MMOL/L (ref 98–107)
CO2: 28 MMOL/L (ref 20–31)
CREAT SERPL-MCNC: 1.11 MG/DL (ref 0.5–0.9)
DIFFERENTIAL TYPE: ABNORMAL
EOSINOPHILS RELATIVE PERCENT: 7 % (ref 0–8)
GFR AFRICAN AMERICAN: 59 ML/MIN
GFR NON-AFRICAN AMERICAN: 49 ML/MIN
GFR SERPL CREATININE-BSD FRML MDRD: ABNORMAL ML/MIN/{1.73_M2}
GFR SERPL CREATININE-BSD FRML MDRD: ABNORMAL ML/MIN/{1.73_M2}
GLUCOSE BLD-MCNC: 101 MG/DL (ref 70–99)
GLUCOSE BLD-MCNC: 94 MG/DL (ref 74–100)
HCT VFR BLD CALC: 28.3 % (ref 36–46)
HEMOGLOBIN: 9.1 G/DL (ref 12–16)
LYMPHOCYTES # BLD: 15 % (ref 24–44)
MCH RBC QN AUTO: 31.5 PG (ref 26–34)
MCHC RBC AUTO-ENTMCNC: 32.4 G/DL (ref 31–37)
MCV RBC AUTO: 97.3 FL (ref 80–100)
MONOCYTES # BLD: 10 % (ref 0–12)
PDW BLD-RTO: 14.9 % (ref 12.1–15.2)
PLATELET # BLD: 338 K/UL (ref 140–450)
PLATELET ESTIMATE: ABNORMAL
PMV BLD AUTO: 8.4 FL (ref 6–12)
POTASSIUM SERPL-SCNC: 4.5 MMOL/L (ref 3.7–5.3)
RBC # BLD: 2.91 M/UL (ref 4–5.2)
RBC # BLD: ABNORMAL 10*6/UL
SEG NEUTROPHILS: 68 % (ref 36–66)
SEGMENTED NEUTROPHILS ABSOLUTE COUNT: 4.5 K/UL (ref 1.8–7.7)
SODIUM BLD-SCNC: 142 MMOL/L (ref 135–144)
WBC # BLD: 6.6 K/UL (ref 3.5–11)
WBC # BLD: ABNORMAL 10*3/UL

## 2017-03-27 PROCEDURE — 6370000000 HC RX 637 (ALT 250 FOR IP): Performed by: INTERNAL MEDICINE

## 2017-03-27 PROCEDURE — 80048 BASIC METABOLIC PNL TOTAL CA: CPT

## 2017-03-27 PROCEDURE — 99231 SBSQ HOSP IP/OBS SF/LOW 25: CPT | Performed by: PODIATRIST

## 2017-03-27 PROCEDURE — 82947 ASSAY GLUCOSE BLOOD QUANT: CPT

## 2017-03-27 PROCEDURE — 6370000000 HC RX 637 (ALT 250 FOR IP): Performed by: PHYSICIAN ASSISTANT

## 2017-03-27 PROCEDURE — 85025 COMPLETE CBC W/AUTO DIFF WBC: CPT

## 2017-03-27 PROCEDURE — 6360000002 HC RX W HCPCS: Performed by: INTERNAL MEDICINE

## 2017-03-27 PROCEDURE — 36415 COLL VENOUS BLD VENIPUNCTURE: CPT

## 2017-03-27 RX ORDER — LEVOFLOXACIN 500 MG/1
500 TABLET, FILM COATED ORAL DAILY
Status: DISCONTINUED | OUTPATIENT
Start: 2017-03-27 | End: 2017-03-27

## 2017-03-27 RX ORDER — HYDROCODONE BITARTRATE AND ACETAMINOPHEN 5; 325 MG/1; MG/1
1 TABLET ORAL EVERY 6 HOURS PRN
Qty: 20 TABLET | Refills: 0 | Status: SHIPPED | OUTPATIENT
Start: 2017-03-27 | End: 2017-04-03

## 2017-03-27 RX ORDER — LEVOFLOXACIN 500 MG/1
500 TABLET, FILM COATED ORAL DAILY
Status: DISCONTINUED | OUTPATIENT
Start: 2017-03-27 | End: 2017-03-27 | Stop reason: HOSPADM

## 2017-03-27 RX ORDER — SODIUM CHLORIDE 0.9 % (FLUSH) 0.9 %
10 SYRINGE (ML) INJECTION PRN
Status: DISCONTINUED | OUTPATIENT
Start: 2017-03-27 | End: 2017-03-27

## 2017-03-27 RX ORDER — LEVOFLOXACIN 500 MG/1
500 TABLET, FILM COATED ORAL DAILY
Qty: 11 TABLET | Refills: 0 | DISCHARGE
Start: 2017-03-27 | End: 2017-04-07

## 2017-03-27 RX ORDER — LEVOFLOXACIN 5 MG/ML
500 INJECTION, SOLUTION INTRAVENOUS EVERY 24 HOURS
Status: DISCONTINUED | OUTPATIENT
Start: 2017-03-27 | End: 2017-03-27

## 2017-03-27 RX ORDER — SODIUM CHLORIDE 0.9 % (FLUSH) 0.9 %
10 SYRINGE (ML) INJECTION EVERY 12 HOURS SCHEDULED
Status: DISCONTINUED | OUTPATIENT
Start: 2017-03-27 | End: 2017-03-27

## 2017-03-27 RX ORDER — LIDOCAINE HYDROCHLORIDE 10 MG/ML
5 INJECTION, SOLUTION EPIDURAL; INFILTRATION; INTRACAUDAL; PERINEURAL ONCE
Status: DISCONTINUED | OUTPATIENT
Start: 2017-03-27 | End: 2017-03-27

## 2017-03-27 RX ADMIN — MELOXICAM 7.5 MG: 7.5 TABLET ORAL at 09:55

## 2017-03-27 RX ADMIN — CARBAMAZEPINE 200 MG: 200 TABLET ORAL at 09:54

## 2017-03-27 RX ADMIN — LEVOFLOXACIN 500 MG: 500 TABLET, FILM COATED ORAL at 09:55

## 2017-03-27 RX ADMIN — CALCIUM CARBONATE-VITAMIN D TAB 500 MG-200 UNIT 1 TABLET: 500-200 TAB at 09:54

## 2017-03-27 RX ADMIN — VENLAFAXINE 75 MG: 75 TABLET ORAL at 09:55

## 2017-03-27 RX ADMIN — CARVEDILOL 25 MG: 25 TABLET, FILM COATED ORAL at 07:49

## 2017-03-27 RX ADMIN — LAMOTRIGINE 75 MG: 25 TABLET ORAL at 09:54

## 2017-03-27 RX ADMIN — CETIRIZINE HYDROCHLORIDE 10 MG: 10 TABLET, FILM COATED ORAL at 09:54

## 2017-03-27 RX ADMIN — AMLODIPINE BESYLATE 10 MG: 10 TABLET ORAL at 09:54

## 2017-03-27 RX ADMIN — POTASSIUM CHLORIDE 10 MEQ: 750 TABLET, FILM COATED, EXTENDED RELEASE ORAL at 09:55

## 2017-03-27 RX ADMIN — OLANZAPINE 2.5 MG: 2.5 TABLET, FILM COATED ORAL at 09:55

## 2017-03-27 RX ADMIN — ENOXAPARIN SODIUM 40 MG: 40 INJECTION SUBCUTANEOUS at 09:55

## 2017-03-27 RX ADMIN — FAMOTIDINE 20 MG: 20 TABLET ORAL at 09:54

## 2017-03-27 RX ADMIN — LISINOPRIL AND HYDROCHLOROTHIAZIDE 2 TABLET: 12.5; 2 TABLET ORAL at 10:00

## 2017-03-27 RX ADMIN — LEVOTHYROXINE SODIUM 150 MCG: 150 TABLET ORAL at 07:49

## 2017-03-27 ASSESSMENT — PAIN SCALES - GENERAL
PAINLEVEL_OUTOF10: 0

## 2017-03-28 ENCOUNTER — CARE COORDINATION (OUTPATIENT)
Dept: FAMILY MEDICINE CLINIC | Age: 67
End: 2017-03-28

## 2017-03-28 LAB
CULTURE: NORMAL
Lab: NORMAL
Lab: NORMAL
SPECIMEN DESCRIPTION: NORMAL
SPECIMEN DESCRIPTION: NORMAL
STATUS: NORMAL
STATUS: NORMAL

## 2017-04-06 ENCOUNTER — HOSPITAL ENCOUNTER (OUTPATIENT)
Age: 67
Discharge: HOME | End: 2017-04-06
Payer: MEDICARE

## 2017-04-06 ENCOUNTER — OFFICE VISIT (OUTPATIENT)
Dept: WOUND CARE | Age: 67
End: 2017-04-06
Payer: MEDICARE

## 2017-04-06 VITALS
HEART RATE: 81 BPM | DIASTOLIC BLOOD PRESSURE: 78 MMHG | BODY MASS INDEX: 44.06 KG/M2 | RESPIRATION RATE: 18 BRPM | SYSTOLIC BLOOD PRESSURE: 129 MMHG | WEIGHT: 258.1 LBS | HEIGHT: 64 IN | TEMPERATURE: 97 F

## 2017-04-06 DIAGNOSIS — L49.1 DERMATITIS, EXFOLIATIVE FROM ERYTHEMATOUS CONDITION ON 10-19% BODY SURFACE: ICD-10-CM

## 2017-04-06 DIAGNOSIS — L03.115 CELLULITIS OF LEG WITHOUT FOOT, RIGHT: Primary | ICD-10-CM

## 2017-04-06 PROCEDURE — 4040F PNEUMOC VAC/ADMIN/RCVD: CPT | Performed by: PODIATRIST

## 2017-04-06 PROCEDURE — G8400 PT W/DXA NO RESULTS DOC: HCPCS | Performed by: PODIATRIST

## 2017-04-06 PROCEDURE — 1036F TOBACCO NON-USER: CPT | Performed by: PODIATRIST

## 2017-04-06 PROCEDURE — G8417 CALC BMI ABV UP PARAM F/U: HCPCS | Performed by: PODIATRIST

## 2017-04-06 PROCEDURE — 99212 OFFICE O/P EST SF 10 MIN: CPT | Performed by: PODIATRIST

## 2017-04-06 PROCEDURE — 3014F SCREEN MAMMO DOC REV: CPT | Performed by: PODIATRIST

## 2017-04-06 PROCEDURE — G8427 DOCREV CUR MEDS BY ELIG CLIN: HCPCS | Performed by: PODIATRIST

## 2017-04-06 PROCEDURE — 99214 OFFICE O/P EST MOD 30 MIN: CPT | Performed by: PODIATRIST

## 2017-04-06 PROCEDURE — 1123F ACP DISCUSS/DSCN MKR DOCD: CPT | Performed by: PODIATRIST

## 2017-04-06 PROCEDURE — 3017F COLORECTAL CA SCREEN DOC REV: CPT | Performed by: PODIATRIST

## 2017-04-06 PROCEDURE — 1111F DSCHRG MED/CURRENT MED MERGE: CPT | Performed by: PODIATRIST

## 2017-04-06 PROCEDURE — 1090F PRES/ABSN URINE INCON ASSESS: CPT | Performed by: PODIATRIST

## 2017-04-06 RX ORDER — HYDROCODONE BITARTRATE AND ACETAMINOPHEN 5; 325 MG/1; MG/1
1 TABLET ORAL EVERY 6 HOURS PRN
COMMUNITY

## 2017-04-20 ENCOUNTER — TELEPHONE (OUTPATIENT)
Dept: WOUND CARE | Age: 67
End: 2017-04-20

## 2017-04-20 ENCOUNTER — OFFICE VISIT (OUTPATIENT)
Dept: WOUND CARE | Age: 67
End: 2017-04-20
Payer: MEDICARE

## 2017-04-20 ENCOUNTER — HOSPITAL ENCOUNTER (OUTPATIENT)
Age: 67
Setting detail: SPECIMEN
Discharge: HOME | End: 2017-04-20
Payer: MEDICARE

## 2017-04-20 VITALS
DIASTOLIC BLOOD PRESSURE: 64 MMHG | HEART RATE: 103 BPM | SYSTOLIC BLOOD PRESSURE: 123 MMHG | TEMPERATURE: 101.4 F | HEIGHT: 64 IN | WEIGHT: 253.8 LBS | BODY MASS INDEX: 43.33 KG/M2 | RESPIRATION RATE: 20 BRPM

## 2017-04-20 DIAGNOSIS — I89.0 LYMPHEDEMA OF BOTH LOWER EXTREMITIES: ICD-10-CM

## 2017-04-20 DIAGNOSIS — R50.9 FEVER, UNSPECIFIED FEVER CAUSE: ICD-10-CM

## 2017-04-20 DIAGNOSIS — L49.1 DERMATITIS, EXFOLIATIVE FROM ERYTHEMATOUS CONDITION ON 10-19% BODY SURFACE: Primary | ICD-10-CM

## 2017-04-20 DIAGNOSIS — L03.115 CELLULITIS OF LEG WITHOUT FOOT, RIGHT: ICD-10-CM

## 2017-04-20 LAB
ABSOLUTE BANDS #: 4.76 K/UL (ref 0–1)
ABSOLUTE EOS #: 0.34 K/UL (ref 0–0.4)
ABSOLUTE LYMPH #: 0.34 K/UL (ref 1–4.8)
ABSOLUTE MONO #: 0.17 K/UL (ref 0–1)
BANDS: 28 % (ref 0–10)
BASOPHILS # BLD: 0 % (ref 0–2)
BASOPHILS ABSOLUTE: 0 K/UL (ref 0–0.2)
DIFFERENTIAL TYPE: ABNORMAL
EOSINOPHILS RELATIVE PERCENT: 2 % (ref 0–8)
HCT VFR BLD CALC: 32 % (ref 36–46)
HEMOGLOBIN: 10.5 G/DL (ref 12–16)
LYMPHOCYTES # BLD: 2 % (ref 24–44)
MCH RBC QN AUTO: 31.7 PG (ref 26–34)
MCHC RBC AUTO-ENTMCNC: 32.8 G/DL (ref 31–37)
MCV RBC AUTO: 96.6 FL (ref 80–100)
MONOCYTES # BLD: 1 % (ref 0–12)
MORPHOLOGY: NORMAL
PDW BLD-RTO: 15.1 % (ref 12.1–15.2)
PLATELET # BLD: 257 K/UL (ref 140–450)
PLATELET ESTIMATE: ABNORMAL
PMV BLD AUTO: ABNORMAL FL (ref 6–12)
RBC # BLD: 3.31 M/UL (ref 4–5.2)
RBC # BLD: ABNORMAL 10*6/UL
SEG NEUTROPHILS: 67 % (ref 36–66)
SEGMENTED NEUTROPHILS ABSOLUTE COUNT: 11.39 K/UL (ref 1.8–7.7)
WBC # BLD: 17 K/UL (ref 3.5–11)
WBC # BLD: ABNORMAL 10*3/UL

## 2017-04-20 PROCEDURE — 36415 COLL VENOUS BLD VENIPUNCTURE: CPT

## 2017-04-20 PROCEDURE — 3017F COLORECTAL CA SCREEN DOC REV: CPT | Performed by: PODIATRIST

## 2017-04-20 PROCEDURE — 1111F DSCHRG MED/CURRENT MED MERGE: CPT | Performed by: PODIATRIST

## 2017-04-20 PROCEDURE — 85025 COMPLETE CBC W/AUTO DIFF WBC: CPT

## 2017-04-20 PROCEDURE — 99212 OFFICE O/P EST SF 10 MIN: CPT | Performed by: PODIATRIST

## 2017-04-20 PROCEDURE — 3014F SCREEN MAMMO DOC REV: CPT | Performed by: PODIATRIST

## 2017-04-20 PROCEDURE — 1090F PRES/ABSN URINE INCON ASSESS: CPT | Performed by: PODIATRIST

## 2017-04-20 PROCEDURE — 1123F ACP DISCUSS/DSCN MKR DOCD: CPT | Performed by: PODIATRIST

## 2017-04-20 PROCEDURE — G8417 CALC BMI ABV UP PARAM F/U: HCPCS | Performed by: PODIATRIST

## 2017-04-20 PROCEDURE — 4040F PNEUMOC VAC/ADMIN/RCVD: CPT | Performed by: PODIATRIST

## 2017-04-20 PROCEDURE — G8400 PT W/DXA NO RESULTS DOC: HCPCS | Performed by: PODIATRIST

## 2017-04-20 PROCEDURE — 1036F TOBACCO NON-USER: CPT | Performed by: PODIATRIST

## 2017-04-20 PROCEDURE — G8427 DOCREV CUR MEDS BY ELIG CLIN: HCPCS | Performed by: PODIATRIST

## 2017-04-20 PROCEDURE — 99214 OFFICE O/P EST MOD 30 MIN: CPT

## 2017-04-20 RX ORDER — MOMETASONE FUROATE 1 MG/G
CREAM TOPICAL
Qty: 30 G | Refills: 1 | Status: ON HOLD
Start: 2017-04-20 | End: 2017-04-24 | Stop reason: HOSPADM

## 2017-04-21 ENCOUNTER — APPOINTMENT (OUTPATIENT)
Dept: GENERAL RADIOLOGY | Age: 67
End: 2017-04-21
Payer: MEDICARE

## 2017-04-21 ENCOUNTER — HOSPITAL ENCOUNTER (INPATIENT)
Age: 67
LOS: 2 days | Discharge: NURSING, REHAB OR LONG-TERM CARE | DRG: 871 | End: 2017-04-24
Attending: EMERGENCY MEDICINE | Admitting: INTERNAL MEDICINE
Payer: MEDICARE

## 2017-04-21 ENCOUNTER — APPOINTMENT (OUTPATIENT)
Dept: CT IMAGING | Age: 67
End: 2017-04-21
Payer: MEDICARE

## 2017-04-21 ENCOUNTER — HOSPITAL ENCOUNTER (EMERGENCY)
Age: 67
Discharge: OTHER INSTITUTION | End: 2017-04-21
Attending: EMERGENCY MEDICINE | Admitting: INTERNAL MEDICINE
Payer: MEDICARE

## 2017-04-21 ENCOUNTER — HOSPITAL ENCOUNTER (OUTPATIENT)
Age: 67
Setting detail: SPECIMEN
Discharge: HOME | End: 2017-04-21
Payer: MEDICARE

## 2017-04-21 VITALS
DIASTOLIC BLOOD PRESSURE: 56 MMHG | HEART RATE: 103 BPM | SYSTOLIC BLOOD PRESSURE: 113 MMHG | TEMPERATURE: 100.2 F | BODY MASS INDEX: 45.49 KG/M2 | OXYGEN SATURATION: 96 % | RESPIRATION RATE: 18 BRPM | WEIGHT: 265 LBS

## 2017-04-21 DIAGNOSIS — L03.119 CELLULITIS OF LOWER EXTREMITY, UNSPECIFIED LATERALITY: ICD-10-CM

## 2017-04-21 DIAGNOSIS — A41.9 SEPSIS, DUE TO UNSPECIFIED ORGANISM: Primary | ICD-10-CM

## 2017-04-21 DIAGNOSIS — I95.9 HYPOTENSION, UNSPECIFIED HYPOTENSION TYPE: Primary | ICD-10-CM

## 2017-04-21 DIAGNOSIS — I50.9 ACUTE CONGESTIVE HEART FAILURE, UNSPECIFIED CONGESTIVE HEART FAILURE TYPE: ICD-10-CM

## 2017-04-21 LAB
% CKMB: 1 % (ref 0–3)
-: ABNORMAL
-: ABNORMAL
ABSOLUTE EOS #: 0.23 K/UL (ref 0–0.4)
ABSOLUTE LYMPH #: 0.7 K/UL (ref 1–4.8)
ABSOLUTE MONO #: 0.23 K/UL (ref 0–1)
ALBUMIN SERPL-MCNC: 3.3 G/DL (ref 3.5–5.2)
ALBUMIN/GLOBULIN RATIO: 0.8 (ref 1–2.5)
ALP BLD-CCNC: 124 U/L (ref 35–104)
ALT SERPL-CCNC: 22 U/L (ref 5–33)
AMORPHOUS: ABNORMAL
AMORPHOUS: ABNORMAL
ANION GAP SERPL CALCULATED.3IONS-SCNC: 13 MMOL/L (ref 9–17)
AST SERPL-CCNC: 37 U/L
BACTERIA: ABNORMAL
BACTERIA: ABNORMAL
BASOPHILS # BLD: 0 % (ref 0–2)
BASOPHILS ABSOLUTE: 0 K/UL (ref 0–0.2)
BILIRUB SERPL-MCNC: 0.29 MG/DL (ref 0.3–1.2)
BILIRUBIN URINE: NEGATIVE
BILIRUBIN URINE: NEGATIVE
BNP INTERPRETATION: ABNORMAL
BUN BLDV-MCNC: 35 MG/DL (ref 8–23)
BUN/CREAT BLD: 21 (ref 9–20)
CALCIUM SERPL-MCNC: 9.1 MG/DL (ref 8.6–10.4)
CARBAMAZEPINE DATE LAST DOSE: ABNORMAL
CARBAMAZEPINE DOSE AMOUNT: ABNORMAL
CARBAMAZEPINE DOSE TIME: ABNORMAL
CARBAMAZEPINE LEVEL: 7.3 UG/ML (ref 8–12)
CASTS UA: ABNORMAL /LPF
CASTS UA: ABNORMAL /LPF
CHLORIDE BLD-SCNC: 96 MMOL/L (ref 98–107)
CK MB: 1.5 NG/ML
CKMB INTERPRETATION: NORMAL
CO2: 29 MMOL/L (ref 20–31)
COLOR: YELLOW
COLOR: YELLOW
COMMENT UA: ABNORMAL
COMMENT UA: ABNORMAL
CREAT SERPL-MCNC: 1.68 MG/DL (ref 0.5–0.9)
CRYSTALS, UA: ABNORMAL /HPF
CRYSTALS, UA: ABNORMAL /HPF
DIFFERENTIAL TYPE: ABNORMAL
EOSINOPHILS RELATIVE PERCENT: 2 % (ref 0–8)
EPITHELIAL CELLS UA: ABNORMAL /HPF (ref 0–25)
EPITHELIAL CELLS UA: ABNORMAL /HPF (ref 0–25)
GFR AFRICAN AMERICAN: 37 ML/MIN
GFR NON-AFRICAN AMERICAN: 30 ML/MIN
GFR SERPL CREATININE-BSD FRML MDRD: ABNORMAL ML/MIN/{1.73_M2}
GFR SERPL CREATININE-BSD FRML MDRD: ABNORMAL ML/MIN/{1.73_M2}
GLUCOSE BLD-MCNC: 127 MG/DL (ref 70–99)
GLUCOSE URINE: NEGATIVE
GLUCOSE URINE: NEGATIVE
HCT VFR BLD CALC: 28 % (ref 36–46)
HEMOGLOBIN: 9.1 G/DL (ref 12–16)
KETONES, URINE: NEGATIVE
KETONES, URINE: NEGATIVE
LACTIC ACID, WHOLE BLOOD: NORMAL MMOL/L (ref 0.7–2.1)
LACTIC ACID: 1.3 MMOL/L (ref 0.5–2.2)
LEUKOCYTE ESTERASE, URINE: NEGATIVE
LEUKOCYTE ESTERASE, URINE: NEGATIVE
LYMPHOCYTES # BLD: 6 % (ref 24–44)
MCH RBC QN AUTO: 31.2 PG (ref 26–34)
MCHC RBC AUTO-ENTMCNC: 32.7 G/DL (ref 31–37)
MCV RBC AUTO: 95.6 FL (ref 80–100)
MONOCYTES # BLD: 2 % (ref 0–12)
MORPHOLOGY: NORMAL
MUCUS: ABNORMAL
MUCUS: ABNORMAL
MYOGLOBIN: 109 NG/ML (ref 25–58)
NITRITE, URINE: NEGATIVE
NITRITE, URINE: NEGATIVE
OTHER OBSERVATIONS UA: ABNORMAL
OTHER OBSERVATIONS UA: ABNORMAL
PDW BLD-RTO: 15.1 % (ref 12.1–15.2)
PH UA: 5 (ref 5–9)
PH UA: 5 (ref 5–9)
PLATELET # BLD: 204 K/UL (ref 140–450)
PLATELET ESTIMATE: ABNORMAL
PMV BLD AUTO: ABNORMAL FL (ref 6–12)
POTASSIUM SERPL-SCNC: 3.9 MMOL/L (ref 3.7–5.3)
PRO-BNP: 1695 PG/ML
PROTEIN UA: ABNORMAL
PROTEIN UA: ABNORMAL
RBC # BLD: 2.93 M/UL (ref 4–5.2)
RBC # BLD: ABNORMAL 10*6/UL
RBC UA: ABNORMAL /HPF (ref 0–2)
RBC UA: ABNORMAL /HPF (ref 0–2)
RENAL EPITHELIAL, UA: ABNORMAL /HPF
RENAL EPITHELIAL, UA: ABNORMAL /HPF
SEG NEUTROPHILS: 90 % (ref 36–66)
SEGMENTED NEUTROPHILS ABSOLUTE COUNT: 10.54 K/UL (ref 1.8–7.7)
SODIUM BLD-SCNC: 138 MMOL/L (ref 135–144)
SPECIFIC GRAVITY UA: 1.02 (ref 1.01–1.02)
SPECIFIC GRAVITY UA: 1.02 (ref 1.01–1.02)
THYROXINE, FREE: 1.22 NG/DL (ref 0.93–1.7)
TOTAL CK: 154 U/L (ref 26–192)
TOTAL PROTEIN: 7.2 G/DL (ref 6.4–8.3)
TRICHOMONAS: ABNORMAL
TRICHOMONAS: ABNORMAL
TROPONIN INTERP: ABNORMAL
TROPONIN T: 0.03 NG/ML
TSH SERPL DL<=0.05 MIU/L-ACNC: 5.94 MIU/L (ref 0.3–5)
TURBIDITY: ABNORMAL
TURBIDITY: CLEAR
URINE HGB: ABNORMAL
URINE HGB: NEGATIVE
UROBILINOGEN, URINE: NORMAL
UROBILINOGEN, URINE: NORMAL
WBC # BLD: 11.7 K/UL (ref 3.5–11)
WBC # BLD: ABNORMAL 10*3/UL
WBC UA: ABNORMAL /HPF (ref 0–5)
WBC UA: ABNORMAL /HPF (ref 0–5)
YEAST: ABNORMAL
YEAST: ABNORMAL

## 2017-04-21 PROCEDURE — 6370000000 HC RX 637 (ALT 250 FOR IP): Performed by: EMERGENCY MEDICINE

## 2017-04-21 PROCEDURE — 96375 TX/PRO/DX INJ NEW DRUG ADDON: CPT

## 2017-04-21 PROCEDURE — 93005 ELECTROCARDIOGRAM TRACING: CPT

## 2017-04-21 PROCEDURE — 81001 URINALYSIS AUTO W/SCOPE: CPT

## 2017-04-21 PROCEDURE — 87086 URINE CULTURE/COLONY COUNT: CPT

## 2017-04-21 PROCEDURE — 85025 COMPLETE CBC W/AUTO DIFF WBC: CPT

## 2017-04-21 PROCEDURE — 80053 COMPREHEN METABOLIC PANEL: CPT

## 2017-04-21 PROCEDURE — 84484 ASSAY OF TROPONIN QUANT: CPT

## 2017-04-21 PROCEDURE — 6360000002 HC RX W HCPCS: Performed by: EMERGENCY MEDICINE

## 2017-04-21 PROCEDURE — 87040 BLOOD CULTURE FOR BACTERIA: CPT

## 2017-04-21 PROCEDURE — 83880 ASSAY OF NATRIURETIC PEPTIDE: CPT

## 2017-04-21 PROCEDURE — 74176 CT ABD & PELVIS W/O CONTRAST: CPT

## 2017-04-21 PROCEDURE — 82553 CREATINE MB FRACTION: CPT

## 2017-04-21 PROCEDURE — 80156 ASSAY CARBAMAZEPINE TOTAL: CPT

## 2017-04-21 PROCEDURE — 83605 ASSAY OF LACTIC ACID: CPT

## 2017-04-21 PROCEDURE — 02HV33Z INSERTION OF INFUSION DEVICE INTO SUPERIOR VENA CAVA, PERCUTANEOUS APPROACH: ICD-10-PCS | Performed by: EMERGENCY MEDICINE

## 2017-04-21 PROCEDURE — 96365 THER/PROPH/DIAG IV INF INIT: CPT

## 2017-04-21 PROCEDURE — 84439 ASSAY OF FREE THYROXINE: CPT

## 2017-04-21 PROCEDURE — 71020 XR CHEST STANDARD TWO VW: CPT

## 2017-04-21 PROCEDURE — 36415 COLL VENOUS BLD VENIPUNCTURE: CPT

## 2017-04-21 PROCEDURE — 2500000003 HC RX 250 WO HCPCS: Performed by: EMERGENCY MEDICINE

## 2017-04-21 PROCEDURE — 83874 ASSAY OF MYOGLOBIN: CPT

## 2017-04-21 PROCEDURE — 99285 EMERGENCY DEPT VISIT HI MDM: CPT

## 2017-04-21 PROCEDURE — 96368 THER/DIAG CONCURRENT INF: CPT

## 2017-04-21 PROCEDURE — 82550 ASSAY OF CK (CPK): CPT

## 2017-04-21 PROCEDURE — 84443 ASSAY THYROID STIM HORMONE: CPT

## 2017-04-21 PROCEDURE — 96366 THER/PROPH/DIAG IV INF ADDON: CPT

## 2017-04-21 PROCEDURE — 2580000003 HC RX 258: Performed by: EMERGENCY MEDICINE

## 2017-04-21 PROCEDURE — 36556 INSERT NON-TUNNEL CV CATH: CPT

## 2017-04-21 PROCEDURE — 96376 TX/PRO/DX INJ SAME DRUG ADON: CPT

## 2017-04-21 RX ORDER — ACETAMINOPHEN 500 MG
1000 TABLET ORAL ONCE
Status: COMPLETED | OUTPATIENT
Start: 2017-04-21 | End: 2017-04-21

## 2017-04-21 RX ORDER — DOPAMINE HYDROCHLORIDE 160 MG/100ML
INJECTION, SOLUTION INTRAVENOUS
Status: DISCONTINUED
Start: 2017-04-21 | End: 2017-04-21 | Stop reason: HOSPADM

## 2017-04-21 RX ORDER — OLANZAPINE 2.5 MG/1
5 TABLET ORAL 2 TIMES DAILY
Status: ON HOLD | COMMUNITY
End: 2020-10-07

## 2017-04-21 RX ORDER — DOPAMINE HYDROCHLORIDE 160 MG/100ML
5 INJECTION, SOLUTION INTRAVENOUS CONTINUOUS
Status: DISCONTINUED | OUTPATIENT
Start: 2017-04-21 | End: 2017-04-21 | Stop reason: HOSPADM

## 2017-04-21 RX ORDER — ACETAMINOPHEN 500 MG
1000 TABLET ORAL EVERY 6 HOURS PRN
Status: ON HOLD | COMMUNITY
End: 2017-04-24 | Stop reason: HOSPADM

## 2017-04-21 RX ORDER — HEPARIN SODIUM 10000 [USP'U]/100ML
2100 INJECTION, SOLUTION INTRAVENOUS CONTINUOUS
Status: DISCONTINUED | OUTPATIENT
Start: 2017-04-21 | End: 2017-04-21 | Stop reason: HOSPADM

## 2017-04-21 RX ORDER — DOPAMINE HYDROCHLORIDE 160 MG/100ML
5 INJECTION, SOLUTION INTRAVENOUS CONTINUOUS
Status: DISCONTINUED | OUTPATIENT
Start: 2017-04-21 | End: 2017-04-21

## 2017-04-21 RX ORDER — SODIUM HYPOCHLORITE 2.5 MG/ML
1 SOLUTION TOPICAL ONCE
Status: ON HOLD | COMMUNITY
End: 2020-10-07

## 2017-04-21 RX ORDER — 0.9 % SODIUM CHLORIDE 0.9 %
250 INTRAVENOUS SOLUTION INTRAVENOUS ONCE
Status: COMPLETED | OUTPATIENT
Start: 2017-04-21 | End: 2017-04-21

## 2017-04-21 RX ORDER — MIDAZOLAM HYDROCHLORIDE 1 MG/ML
INJECTION INTRAMUSCULAR; INTRAVENOUS
Status: DISCONTINUED
Start: 2017-04-21 | End: 2017-04-22 | Stop reason: WASHOUT

## 2017-04-21 RX ORDER — NOREPINEPHRINE BITARTRATE 1 MG/ML
INJECTION, SOLUTION INTRAVENOUS
Status: DISCONTINUED
Start: 2017-04-21 | End: 2017-04-21 | Stop reason: HOSPADM

## 2017-04-21 RX ORDER — 0.9 % SODIUM CHLORIDE 0.9 %
1000 INTRAVENOUS SOLUTION INTRAVENOUS ONCE
Status: COMPLETED | OUTPATIENT
Start: 2017-04-21 | End: 2017-04-21

## 2017-04-21 RX ORDER — HEPARIN SODIUM 1000 [USP'U]/ML
80 INJECTION, SOLUTION INTRAVENOUS; SUBCUTANEOUS ONCE
Status: COMPLETED | OUTPATIENT
Start: 2017-04-21 | End: 2017-04-21

## 2017-04-21 RX ADMIN — DOPAMINE HYDROCHLORIDE 5 MCG/KG/MIN: 160 INJECTION, SOLUTION INTRAVENOUS at 19:15

## 2017-04-21 RX ADMIN — SODIUM CHLORIDE 250 ML: 9 INJECTION, SOLUTION INTRAVENOUS at 19:38

## 2017-04-21 RX ADMIN — ACETAMINOPHEN 1000 MG: 500 TABLET ORAL at 20:06

## 2017-04-21 RX ADMIN — SODIUM CHLORIDE 1000 ML: 9 INJECTION, SOLUTION INTRAVENOUS at 20:05

## 2017-04-21 RX ADMIN — DEXTROSE MONOHYDRATE 1500 MG: 50 INJECTION, SOLUTION INTRAVENOUS at 21:40

## 2017-04-21 RX ADMIN — HEPARIN SODIUM 9600 UNITS: 1000 INJECTION, SOLUTION INTRAVENOUS; SUBCUTANEOUS at 21:33

## 2017-04-21 RX ADMIN — NOREPINEPHRINE BITARTRATE 10 MCG/MIN: 1 INJECTION INTRAVENOUS at 20:02

## 2017-04-21 RX ADMIN — HEPARIN SODIUM AND DEXTROSE 2100 UNITS/HR: 10000; 5 INJECTION INTRAVENOUS at 21:41

## 2017-04-21 RX ADMIN — CEFTRIAXONE 1 G: 1 INJECTION, POWDER, FOR SOLUTION INTRAMUSCULAR; INTRAVENOUS at 20:24

## 2017-04-21 ASSESSMENT — ENCOUNTER SYMPTOMS
BACK PAIN: 0
NAUSEA: 0
COLOR CHANGE: 0
WHEEZING: 0
VOMITING: 0
ABDOMINAL PAIN: 1
COUGH: 0
DIARRHEA: 0
SHORTNESS OF BREATH: 1
CONSTIPATION: 0
TROUBLE SWALLOWING: 0
ABDOMINAL DISTENTION: 0

## 2017-04-21 ASSESSMENT — PAIN SCALES - GENERAL: PAINLEVEL_OUTOF10: 0

## 2017-04-22 ENCOUNTER — APPOINTMENT (OUTPATIENT)
Dept: GENERAL RADIOLOGY | Age: 67
DRG: 871 | End: 2017-04-22
Payer: MEDICARE

## 2017-04-22 ENCOUNTER — APPOINTMENT (OUTPATIENT)
Dept: ULTRASOUND IMAGING | Age: 67
DRG: 871 | End: 2017-04-22
Payer: MEDICARE

## 2017-04-22 PROBLEM — A41.52 SEPSIS DUE TO PSEUDOMONAS SPECIES (HCC): Status: ACTIVE | Noted: 2017-03-26

## 2017-04-22 PROBLEM — A49.01 METHICILLIN SUSCEPTIBLE STAPHYLOCOCCUS AUREUS INFECTION: Status: RESOLVED | Noted: 2017-03-26 | Resolved: 2017-04-22

## 2017-04-22 PROBLEM — A41.9 SEPSIS (HCC): Status: ACTIVE | Noted: 2017-04-22

## 2017-04-22 PROBLEM — L03.116 CELLULITIS OF LEFT LOWER EXTREMITY: Status: ACTIVE | Noted: 2017-03-23

## 2017-04-22 PROBLEM — A49.8 BACTERIAL INFECTION DUE TO PSEUDOMONAS: Status: RESOLVED | Noted: 2017-03-26 | Resolved: 2017-04-22

## 2017-04-22 LAB
-: NORMAL
ABSOLUTE EOS #: 0.2 K/UL (ref 0–0.4)
ABSOLUTE LYMPH #: 0.8 K/UL (ref 1–4.8)
ABSOLUTE MONO #: 0.5 K/UL (ref 0.1–1.2)
ALBUMIN SERPL-MCNC: 3.1 G/DL (ref 3.5–5.2)
ALBUMIN/GLOBULIN RATIO: 0.8 (ref 1–2.5)
ALP BLD-CCNC: 202 U/L (ref 35–104)
ALT SERPL-CCNC: 62 U/L (ref 5–33)
AMORPHOUS: NORMAL
AMYLASE: 32 U/L (ref 28–100)
ANION GAP SERPL CALCULATED.3IONS-SCNC: 15 MMOL/L (ref 9–17)
AST SERPL-CCNC: 103 U/L
BACTERIA: NORMAL
BASOPHILS # BLD: 1 % (ref 0–2)
BASOPHILS ABSOLUTE: 0 K/UL (ref 0–0.2)
BILIRUB SERPL-MCNC: 0.25 MG/DL (ref 0.3–1.2)
BILIRUBIN URINE: NEGATIVE
BUN BLDV-MCNC: 34 MG/DL (ref 8–23)
BUN/CREAT BLD: ABNORMAL (ref 9–20)
CALCIUM SERPL-MCNC: 8.3 MG/DL (ref 8.6–10.4)
CASTS UA: NORMAL /LPF (ref 0–2)
CHLORIDE BLD-SCNC: 100 MMOL/L (ref 98–107)
CO2: 25 MMOL/L (ref 20–31)
COLOR: YELLOW
COMMENT UA: ABNORMAL
CREAT SERPL-MCNC: 1.34 MG/DL (ref 0.5–0.9)
CRYSTALS, UA: NORMAL /HPF
CULTURE: NORMAL
CULTURE: NORMAL
DIFFERENTIAL TYPE: ABNORMAL
EKG ATRIAL RATE: 94 BPM
EKG P AXIS: 54 DEGREES
EKG P-R INTERVAL: 136 MS
EKG Q-T INTERVAL: 350 MS
EKG QRS DURATION: 72 MS
EKG QTC CALCULATION (BAZETT): 437 MS
EKG R AXIS: 2 DEGREES
EKG T AXIS: 55 DEGREES
EKG VENTRICULAR RATE: 94 BPM
EOSINOPHILS RELATIVE PERCENT: 2 % (ref 1–4)
EPITHELIAL CELLS UA: NORMAL /HPF (ref 0–5)
FERRITIN: 766 UG/L (ref 13–150)
FOLATE: >20 NG/ML
GFR AFRICAN AMERICAN: 48 ML/MIN
GFR NON-AFRICAN AMERICAN: 39 ML/MIN
GFR SERPL CREATININE-BSD FRML MDRD: ABNORMAL ML/MIN/{1.73_M2}
GFR SERPL CREATININE-BSD FRML MDRD: ABNORMAL ML/MIN/{1.73_M2}
GGT: 297 U/L (ref 5–36)
GLUCOSE BLD-MCNC: 102 MG/DL (ref 65–105)
GLUCOSE BLD-MCNC: 115 MG/DL (ref 65–105)
GLUCOSE BLD-MCNC: 118 MG/DL (ref 65–105)
GLUCOSE BLD-MCNC: 134 MG/DL (ref 70–99)
GLUCOSE BLD-MCNC: 143 MG/DL (ref 65–105)
GLUCOSE BLD-MCNC: 172 MG/DL (ref 65–105)
GLUCOSE URINE: NEGATIVE
HCT VFR BLD CALC: 27.3 % (ref 36–46)
HEMOGLOBIN: 9.2 G/DL (ref 12–16)
IRON SATURATION: 8 % (ref 20–55)
IRON: 18 UG/DL (ref 37–145)
KETONES, URINE: NEGATIVE
LACTIC ACID, WHOLE BLOOD: 1.2 MMOL/L (ref 0.7–2.1)
LACTIC ACID, WHOLE BLOOD: 1.4 MMOL/L (ref 0.7–2.1)
LACTIC ACID: NORMAL MMOL/L
LACTIC ACID: NORMAL MMOL/L
LEUKOCYTE ESTERASE, URINE: NEGATIVE
LIPASE: 38 U/L (ref 13–60)
LYMPHOCYTES # BLD: 8 % (ref 24–44)
Lab: NORMAL
Lab: NORMAL
MCH RBC QN AUTO: 32 PG (ref 26–34)
MCHC RBC AUTO-ENTMCNC: 33.7 G/DL (ref 31–37)
MCV RBC AUTO: 94.9 FL (ref 80–100)
MONOCYTES # BLD: 5 % (ref 2–11)
MRSA, DNA, NASAL: NORMAL
MUCUS: NORMAL
NITRITE, URINE: NEGATIVE
OTHER OBSERVATIONS UA: NORMAL
PDW BLD-RTO: 15.7 % (ref 12.5–15.4)
PH UA: 5 (ref 5–8)
PLATELET # BLD: 213 K/UL (ref 140–450)
PLATELET ESTIMATE: ABNORMAL
PMV BLD AUTO: 9.1 FL (ref 6–12)
POTASSIUM SERPL-SCNC: 4.4 MMOL/L (ref 3.7–5.3)
PROCALCITONIN: 0.27 NG/ML
PROTEIN UA: ABNORMAL
RBC # BLD: 2.88 M/UL (ref 4–5.2)
RBC # BLD: ABNORMAL 10*6/UL
RBC UA: NORMAL /HPF (ref 0–2)
RENAL EPITHELIAL, UA: NORMAL /HPF
SEG NEUTROPHILS: 84 % (ref 36–66)
SEGMENTED NEUTROPHILS ABSOLUTE COUNT: 8.4 K/UL (ref 1.8–7.7)
SODIUM BLD-SCNC: 140 MMOL/L (ref 135–144)
SPECIFIC GRAVITY UA: 1.02 (ref 1–1.03)
SPECIMEN DESCRIPTION: NORMAL
STATUS: NORMAL
TOTAL IRON BINDING CAPACITY: 215 UG/DL (ref 250–450)
TOTAL PROTEIN: 7.1 G/DL (ref 6.4–8.3)
TRANSFERRIN: 141 MG/DL (ref 200–360)
TRICHOMONAS: NORMAL
TROPONIN INTERP: NORMAL
TROPONIN T: <0.03 NG/ML
TURBIDITY: ABNORMAL
UNSATURATED IRON BINDING CAPACITY: 197 UG/DL (ref 112–347)
URINE HGB: NEGATIVE
UROBILINOGEN, URINE: NORMAL
VITAMIN B-12: 567 PG/ML (ref 211–946)
VITAMIN D 25-HYDROXY: 14.4 NG/ML (ref 30–100)
WBC # BLD: 10 K/UL (ref 3.5–11)
WBC # BLD: ABNORMAL 10*3/UL
WBC UA: NORMAL /HPF (ref 0–5)
YEAST: NORMAL

## 2017-04-22 PROCEDURE — 2580000003 HC RX 258: Performed by: STUDENT IN AN ORGANIZED HEALTH CARE EDUCATION/TRAINING PROGRAM

## 2017-04-22 PROCEDURE — 2580000003 HC RX 258: Performed by: INTERNAL MEDICINE

## 2017-04-22 PROCEDURE — 93005 ELECTROCARDIOGRAM TRACING: CPT

## 2017-04-22 PROCEDURE — 83690 ASSAY OF LIPASE: CPT

## 2017-04-22 PROCEDURE — 6360000002 HC RX W HCPCS: Performed by: INTERNAL MEDICINE

## 2017-04-22 PROCEDURE — 82607 VITAMIN B-12: CPT

## 2017-04-22 PROCEDURE — 1200000000 HC SEMI PRIVATE

## 2017-04-22 PROCEDURE — 84145 PROCALCITONIN (PCT): CPT

## 2017-04-22 PROCEDURE — 82728 ASSAY OF FERRITIN: CPT

## 2017-04-22 PROCEDURE — 82746 ASSAY OF FOLIC ACID SERUM: CPT

## 2017-04-22 PROCEDURE — 6370000000 HC RX 637 (ALT 250 FOR IP): Performed by: STUDENT IN AN ORGANIZED HEALTH CARE EDUCATION/TRAINING PROGRAM

## 2017-04-22 PROCEDURE — 87641 MR-STAPH DNA AMP PROBE: CPT

## 2017-04-22 PROCEDURE — 82150 ASSAY OF AMYLASE: CPT

## 2017-04-22 PROCEDURE — 99291 CRITICAL CARE FIRST HOUR: CPT | Performed by: INTERNAL MEDICINE

## 2017-04-22 PROCEDURE — 83550 IRON BINDING TEST: CPT

## 2017-04-22 PROCEDURE — 36415 COLL VENOUS BLD VENIPUNCTURE: CPT

## 2017-04-22 PROCEDURE — 82306 VITAMIN D 25 HYDROXY: CPT

## 2017-04-22 PROCEDURE — 73590 X-RAY EXAM OF LOWER LEG: CPT

## 2017-04-22 PROCEDURE — 94762 N-INVAS EAR/PLS OXIMTRY CONT: CPT

## 2017-04-22 PROCEDURE — 81001 URINALYSIS AUTO W/SCOPE: CPT

## 2017-04-22 PROCEDURE — 76705 ECHO EXAM OF ABDOMEN: CPT

## 2017-04-22 PROCEDURE — 85025 COMPLETE CBC W/AUTO DIFF WBC: CPT

## 2017-04-22 PROCEDURE — 82977 ASSAY OF GGT: CPT

## 2017-04-22 PROCEDURE — 82947 ASSAY GLUCOSE BLOOD QUANT: CPT

## 2017-04-22 PROCEDURE — 80053 COMPREHEN METABOLIC PANEL: CPT

## 2017-04-22 PROCEDURE — 83605 ASSAY OF LACTIC ACID: CPT

## 2017-04-22 PROCEDURE — 6360000002 HC RX W HCPCS: Performed by: STUDENT IN AN ORGANIZED HEALTH CARE EDUCATION/TRAINING PROGRAM

## 2017-04-22 PROCEDURE — 84466 ASSAY OF TRANSFERRIN: CPT

## 2017-04-22 PROCEDURE — 71010 XR CHEST PORTABLE: CPT

## 2017-04-22 PROCEDURE — 83540 ASSAY OF IRON: CPT

## 2017-04-22 RX ORDER — ONDANSETRON 2 MG/ML
4 INJECTION INTRAMUSCULAR; INTRAVENOUS EVERY 6 HOURS PRN
Status: DISCONTINUED | OUTPATIENT
Start: 2017-04-22 | End: 2017-04-24 | Stop reason: HOSPADM

## 2017-04-22 RX ORDER — IPRATROPIUM BROMIDE AND ALBUTEROL SULFATE 2.5; .5 MG/3ML; MG/3ML
1 SOLUTION RESPIRATORY (INHALATION) 4 TIMES DAILY
Status: DISCONTINUED | OUTPATIENT
Start: 2017-04-22 | End: 2017-04-22 | Stop reason: DRUGHIGH

## 2017-04-22 RX ORDER — SODIUM CHLORIDE 9 MG/ML
INJECTION, SOLUTION INTRAVENOUS CONTINUOUS
Status: DISCONTINUED | OUTPATIENT
Start: 2017-04-22 | End: 2017-04-24 | Stop reason: HOSPADM

## 2017-04-22 RX ORDER — MINERAL OIL/I-PROP MYR/WATER
LOTION (ML) TOPICAL 2 TIMES DAILY PRN
Status: DISCONTINUED | OUTPATIENT
Start: 2017-04-22 | End: 2017-04-24 | Stop reason: HOSPADM

## 2017-04-22 RX ORDER — DEXTROSE MONOHYDRATE 25 G/50ML
12.5 INJECTION, SOLUTION INTRAVENOUS PRN
Status: DISCONTINUED | OUTPATIENT
Start: 2017-04-22 | End: 2017-04-24 | Stop reason: HOSPADM

## 2017-04-22 RX ORDER — FENTANYL CITRATE 50 UG/ML
25 INJECTION, SOLUTION INTRAMUSCULAR; INTRAVENOUS
Status: DISCONTINUED | OUTPATIENT
Start: 2017-04-22 | End: 2017-04-24 | Stop reason: HOSPADM

## 2017-04-22 RX ORDER — HEPARIN SODIUM 5000 [USP'U]/ML
5000 INJECTION, SOLUTION INTRAVENOUS; SUBCUTANEOUS EVERY 8 HOURS SCHEDULED
Status: DISCONTINUED | OUTPATIENT
Start: 2017-04-22 | End: 2017-04-24 | Stop reason: HOSPADM

## 2017-04-22 RX ORDER — DEXTROSE MONOHYDRATE 50 MG/ML
100 INJECTION, SOLUTION INTRAVENOUS PRN
Status: DISCONTINUED | OUTPATIENT
Start: 2017-04-22 | End: 2017-04-24 | Stop reason: HOSPADM

## 2017-04-22 RX ORDER — BACITRACIN, NEOMYCIN, POLYMYXIN B 400; 3.5; 5 [USP'U]/G; MG/G; [USP'U]/G
OINTMENT TOPICAL 2 TIMES DAILY
Status: DISCONTINUED | OUTPATIENT
Start: 2017-04-22 | End: 2017-04-22

## 2017-04-22 RX ORDER — LAMOTRIGINE 100 MG/1
100 TABLET ORAL DAILY
Status: DISCONTINUED | OUTPATIENT
Start: 2017-04-22 | End: 2017-04-24 | Stop reason: HOSPADM

## 2017-04-22 RX ORDER — ACETAMINOPHEN 325 MG/1
650 TABLET ORAL EVERY 4 HOURS PRN
Status: DISCONTINUED | OUTPATIENT
Start: 2017-04-22 | End: 2017-04-24 | Stop reason: HOSPADM

## 2017-04-22 RX ORDER — SODIUM CHLORIDE 0.9 % (FLUSH) 0.9 %
10 SYRINGE (ML) INJECTION PRN
Status: DISCONTINUED | OUTPATIENT
Start: 2017-04-22 | End: 2017-04-24 | Stop reason: HOSPADM

## 2017-04-22 RX ORDER — OLANZAPINE 5 MG/1
2.5 TABLET ORAL 2 TIMES DAILY
Status: DISCONTINUED | OUTPATIENT
Start: 2017-04-22 | End: 2017-04-24 | Stop reason: HOSPADM

## 2017-04-22 RX ORDER — VANCOMYCIN HYDROCHLORIDE 1 G/200ML
1000 INJECTION, SOLUTION INTRAVENOUS EVERY 12 HOURS
Status: DISCONTINUED | OUTPATIENT
Start: 2017-04-22 | End: 2017-04-22

## 2017-04-22 RX ORDER — SODIUM CHLORIDE 0.9 % (FLUSH) 0.9 %
10 SYRINGE (ML) INJECTION EVERY 12 HOURS SCHEDULED
Status: DISCONTINUED | OUTPATIENT
Start: 2017-04-22 | End: 2017-04-24 | Stop reason: HOSPADM

## 2017-04-22 RX ORDER — NICOTINE POLACRILEX 4 MG
15 LOZENGE BUCCAL PRN
Status: DISCONTINUED | OUTPATIENT
Start: 2017-04-22 | End: 2017-04-24 | Stop reason: HOSPADM

## 2017-04-22 RX ORDER — CARBAMAZEPINE 100 MG/1
100 TABLET, CHEWABLE ORAL DAILY
Status: DISCONTINUED | OUTPATIENT
Start: 2017-04-22 | End: 2017-04-24 | Stop reason: HOSPADM

## 2017-04-22 RX ORDER — LEVOTHYROXINE SODIUM 0.07 MG/1
150 TABLET ORAL DAILY
Status: DISCONTINUED | OUTPATIENT
Start: 2017-04-22 | End: 2017-04-24 | Stop reason: HOSPADM

## 2017-04-22 RX ORDER — ALBUTEROL SULFATE 2.5 MG/3ML
2.5 SOLUTION RESPIRATORY (INHALATION) EVERY 6 HOURS PRN
Status: DISCONTINUED | OUTPATIENT
Start: 2017-04-22 | End: 2017-04-24 | Stop reason: HOSPADM

## 2017-04-22 RX ORDER — MIDAZOLAM HYDROCHLORIDE 1 MG/ML
INJECTION INTRAMUSCULAR; INTRAVENOUS
Status: DISPENSED
Start: 2017-04-22 | End: 2017-04-22

## 2017-04-22 RX ORDER — VENLAFAXINE 37.5 MG/1
37.5 TABLET ORAL DAILY
Status: DISCONTINUED | OUTPATIENT
Start: 2017-04-22 | End: 2017-04-24 | Stop reason: HOSPADM

## 2017-04-22 RX ADMIN — DEXTROSE MONOHYDRATE 1 MILLION UNITS: 50 INJECTION, SOLUTION INTRAVENOUS at 21:51

## 2017-04-22 RX ADMIN — OLANZAPINE 2.5 MG: 5 TABLET, FILM COATED ORAL at 21:50

## 2017-04-22 RX ADMIN — SODIUM CHLORIDE 100 ML/HR: 9 INJECTION, SOLUTION INTRAVENOUS at 04:00

## 2017-04-22 RX ADMIN — OLANZAPINE 2.5 MG: 5 TABLET, FILM COATED ORAL at 10:30

## 2017-04-22 RX ADMIN — ACETAMINOPHEN 650 MG: 325 TABLET ORAL at 11:05

## 2017-04-22 RX ADMIN — LEVOTHYROXINE SODIUM 150 MCG: 75 TABLET ORAL at 06:48

## 2017-04-22 RX ADMIN — HEPARIN SODIUM 5000 UNITS: 5000 INJECTION, SOLUTION INTRAVENOUS; SUBCUTANEOUS at 06:39

## 2017-04-22 RX ADMIN — Medication 10 ML: at 10:31

## 2017-04-22 RX ADMIN — HEPARIN SODIUM 5000 UNITS: 5000 INJECTION, SOLUTION INTRAVENOUS; SUBCUTANEOUS at 21:51

## 2017-04-22 RX ADMIN — DEXTROSE MONOHYDRATE 1 MILLION UNITS: 50 INJECTION, SOLUTION INTRAVENOUS at 17:49

## 2017-04-22 RX ADMIN — HEPARIN SODIUM 5000 UNITS: 5000 INJECTION, SOLUTION INTRAVENOUS; SUBCUTANEOUS at 14:34

## 2017-04-22 RX ADMIN — Medication 10 ML: at 21:51

## 2017-04-22 RX ADMIN — CEFEPIME 2 G: 2 INJECTION, POWDER, FOR SOLUTION INTRAVENOUS at 05:12

## 2017-04-22 RX ADMIN — CARBAMAZEPINE 100 MG: 100 TABLET, CHEWABLE ORAL at 10:30

## 2017-04-22 RX ADMIN — VENLAFAXINE HYDROCHLORIDE 37.5 MG: 37.5 TABLET ORAL at 10:31

## 2017-04-22 RX ADMIN — INSULIN LISPRO 2 UNITS: 100 INJECTION, SOLUTION INTRAVENOUS; SUBCUTANEOUS at 22:01

## 2017-04-22 RX ADMIN — LAMOTRIGINE 100 MG: 100 TABLET ORAL at 10:31

## 2017-04-22 RX ADMIN — POLYMYXIN B SULFATE, BACITRACIN ZINC, NEOMYCIN SULFATE: 5000; 3.5; 4 OINTMENT TOPICAL at 10:31

## 2017-04-22 ASSESSMENT — ENCOUNTER SYMPTOMS
VOMITING: 0
SORE THROAT: 0
DIARRHEA: 0
TROUBLE SWALLOWING: 0
BLOOD IN STOOL: 0
CONSTIPATION: 0
SHORTNESS OF BREATH: 0
COUGH: 0
RHINORRHEA: 0
ABDOMINAL PAIN: 1
NAUSEA: 0
WHEEZING: 0

## 2017-04-22 ASSESSMENT — PAIN SCALES - GENERAL
PAINLEVEL_OUTOF10: 8
PAINLEVEL_OUTOF10: 0
PAINLEVEL_OUTOF10: 0

## 2017-04-22 ASSESSMENT — PAIN DESCRIPTION - LOCATION: LOCATION: FOOT

## 2017-04-22 ASSESSMENT — PAIN SCALES - WONG BAKER: WONGBAKER_NUMERICALRESPONSE: 2

## 2017-04-23 PROBLEM — D50.8 IRON DEFICIENCY ANEMIA SECONDARY TO INADEQUATE DIETARY IRON INTAKE: Status: ACTIVE | Noted: 2017-04-23

## 2017-04-23 PROBLEM — M83.9 OSTEOMALACIA: Status: ACTIVE | Noted: 2017-04-23

## 2017-04-23 LAB
ABSOLUTE EOS #: 0.3 K/UL (ref 0–0.4)
ABSOLUTE LYMPH #: 0.6 K/UL (ref 1–4.8)
ABSOLUTE MONO #: 0.4 K/UL (ref 0.1–1.2)
ALBUMIN SERPL-MCNC: 2.7 G/DL (ref 3.5–5.2)
ALBUMIN/GLOBULIN RATIO: 0.7 (ref 1–2.5)
ALP BLD-CCNC: 199 U/L (ref 35–104)
ALT SERPL-CCNC: 64 U/L (ref 5–33)
ANION GAP SERPL CALCULATED.3IONS-SCNC: 13 MMOL/L (ref 9–17)
AST SERPL-CCNC: 72 U/L
BASOPHILS # BLD: 0 % (ref 0–2)
BASOPHILS ABSOLUTE: 0 K/UL (ref 0–0.2)
BILIRUB SERPL-MCNC: <0.1 MG/DL (ref 0.3–1.2)
BILIRUBIN DIRECT: <0.08 MG/DL
BILIRUBIN, INDIRECT: ABNORMAL MG/DL (ref 0–1)
BUN BLDV-MCNC: 23 MG/DL (ref 8–23)
BUN/CREAT BLD: ABNORMAL (ref 9–20)
CALCIUM SERPL-MCNC: 8 MG/DL (ref 8.6–10.4)
CHLORIDE BLD-SCNC: 103 MMOL/L (ref 98–107)
CO2: 24 MMOL/L (ref 20–31)
CREAT SERPL-MCNC: 0.94 MG/DL (ref 0.5–0.9)
DIFFERENTIAL TYPE: ABNORMAL
EOSINOPHILS RELATIVE PERCENT: 5 % (ref 1–4)
GFR AFRICAN AMERICAN: >60 ML/MIN
GFR NON-AFRICAN AMERICAN: 59 ML/MIN
GFR SERPL CREATININE-BSD FRML MDRD: ABNORMAL ML/MIN/{1.73_M2}
GFR SERPL CREATININE-BSD FRML MDRD: ABNORMAL ML/MIN/{1.73_M2}
GLOBULIN: ABNORMAL G/DL (ref 1.5–3.8)
GLUCOSE BLD-MCNC: 101 MG/DL (ref 65–105)
GLUCOSE BLD-MCNC: 106 MG/DL (ref 65–105)
GLUCOSE BLD-MCNC: 108 MG/DL (ref 65–105)
GLUCOSE BLD-MCNC: 112 MG/DL (ref 65–105)
GLUCOSE BLD-MCNC: 113 MG/DL (ref 70–99)
HCT VFR BLD CALC: 25 % (ref 36–46)
HEMOGLOBIN: 8.2 G/DL (ref 12–16)
LYMPHOCYTES # BLD: 11 % (ref 24–44)
MCH RBC QN AUTO: 31.7 PG (ref 26–34)
MCHC RBC AUTO-ENTMCNC: 32.9 G/DL (ref 31–37)
MCV RBC AUTO: 96.5 FL (ref 80–100)
MONOCYTES # BLD: 7 % (ref 2–11)
MYOGLOBIN: 42 NG/ML (ref 25–58)
PDW BLD-RTO: 16.1 % (ref 12.5–15.4)
PLATELET # BLD: 196 K/UL (ref 140–450)
PLATELET ESTIMATE: ABNORMAL
PMV BLD AUTO: 9.4 FL (ref 6–12)
POTASSIUM SERPL-SCNC: 3.9 MMOL/L (ref 3.7–5.3)
RBC # BLD: 2.59 M/UL (ref 4–5.2)
RBC # BLD: ABNORMAL 10*6/UL
SEG NEUTROPHILS: 77 % (ref 36–66)
SEGMENTED NEUTROPHILS ABSOLUTE COUNT: 4.3 K/UL (ref 1.8–7.7)
SODIUM BLD-SCNC: 140 MMOL/L (ref 135–144)
TOTAL PROTEIN: 6.4 G/DL (ref 6.4–8.3)
WBC # BLD: 5.6 K/UL (ref 3.5–11)
WBC # BLD: ABNORMAL 10*3/UL

## 2017-04-23 PROCEDURE — 85025 COMPLETE CBC W/AUTO DIFF WBC: CPT

## 2017-04-23 PROCEDURE — 2580000003 HC RX 258: Performed by: STUDENT IN AN ORGANIZED HEALTH CARE EDUCATION/TRAINING PROGRAM

## 2017-04-23 PROCEDURE — 6360000002 HC RX W HCPCS: Performed by: STUDENT IN AN ORGANIZED HEALTH CARE EDUCATION/TRAINING PROGRAM

## 2017-04-23 PROCEDURE — 99233 SBSQ HOSP IP/OBS HIGH 50: CPT | Performed by: INTERNAL MEDICINE

## 2017-04-23 PROCEDURE — 83874 ASSAY OF MYOGLOBIN: CPT

## 2017-04-23 PROCEDURE — 6370000000 HC RX 637 (ALT 250 FOR IP): Performed by: INTERNAL MEDICINE

## 2017-04-23 PROCEDURE — 36415 COLL VENOUS BLD VENIPUNCTURE: CPT

## 2017-04-23 PROCEDURE — 82947 ASSAY GLUCOSE BLOOD QUANT: CPT

## 2017-04-23 PROCEDURE — 80076 HEPATIC FUNCTION PANEL: CPT

## 2017-04-23 PROCEDURE — 1200000000 HC SEMI PRIVATE

## 2017-04-23 PROCEDURE — 2580000003 HC RX 258: Performed by: INTERNAL MEDICINE

## 2017-04-23 PROCEDURE — 80048 BASIC METABOLIC PNL TOTAL CA: CPT

## 2017-04-23 PROCEDURE — 6360000002 HC RX W HCPCS: Performed by: INTERNAL MEDICINE

## 2017-04-23 PROCEDURE — 6370000000 HC RX 637 (ALT 250 FOR IP): Performed by: STUDENT IN AN ORGANIZED HEALTH CARE EDUCATION/TRAINING PROGRAM

## 2017-04-23 PROCEDURE — 94660 CPAP INITIATION&MGMT: CPT

## 2017-04-23 RX ORDER — LANOLIN ALCOHOL/MO/W.PET/CERES
CREAM (GRAM) TOPICAL 2 TIMES DAILY
Status: DISCONTINUED | OUTPATIENT
Start: 2017-04-23 | End: 2017-04-24 | Stop reason: HOSPADM

## 2017-04-23 RX ORDER — ERGOCALCIFEROL 1.25 MG/1
50000 CAPSULE ORAL WEEKLY
Status: DISCONTINUED | OUTPATIENT
Start: 2017-04-23 | End: 2017-04-24 | Stop reason: HOSPADM

## 2017-04-23 RX ADMIN — HEPARIN SODIUM 5000 UNITS: 5000 INJECTION, SOLUTION INTRAVENOUS; SUBCUTANEOUS at 21:16

## 2017-04-23 RX ADMIN — HEPARIN SODIUM 5000 UNITS: 5000 INJECTION, SOLUTION INTRAVENOUS; SUBCUTANEOUS at 14:47

## 2017-04-23 RX ADMIN — LEVOTHYROXINE SODIUM 150 MCG: 75 TABLET ORAL at 06:14

## 2017-04-23 RX ADMIN — LAMOTRIGINE 100 MG: 100 TABLET ORAL at 09:53

## 2017-04-23 RX ADMIN — HEPARIN SODIUM 5000 UNITS: 5000 INJECTION, SOLUTION INTRAVENOUS; SUBCUTANEOUS at 06:14

## 2017-04-23 RX ADMIN — DEXTROSE MONOHYDRATE 1 MILLION UNITS: 50 INJECTION, SOLUTION INTRAVENOUS at 02:16

## 2017-04-23 RX ADMIN — OLANZAPINE 2.5 MG: 5 TABLET, FILM COATED ORAL at 21:17

## 2017-04-23 RX ADMIN — IRON SUCROSE 200 MG: 20 INJECTION, SOLUTION INTRAVENOUS at 09:53

## 2017-04-23 RX ADMIN — VENLAFAXINE HYDROCHLORIDE 37.5 MG: 37.5 TABLET ORAL at 09:53

## 2017-04-23 RX ADMIN — ERGOCALCIFEROL 50000 UNITS: 1.25 CAPSULE ORAL at 09:53

## 2017-04-23 RX ADMIN — Medication: at 09:55

## 2017-04-23 RX ADMIN — Medication 10 ML: at 21:28

## 2017-04-23 RX ADMIN — SODIUM CHLORIDE: 9 INJECTION, SOLUTION INTRAVENOUS at 01:11

## 2017-04-23 RX ADMIN — Medication: at 01:06

## 2017-04-23 RX ADMIN — Medication: at 21:20

## 2017-04-23 RX ADMIN — CARBAMAZEPINE 100 MG: 100 TABLET, CHEWABLE ORAL at 09:53

## 2017-04-23 RX ADMIN — OLANZAPINE 2.5 MG: 5 TABLET, FILM COATED ORAL at 09:53

## 2017-04-23 ASSESSMENT — PAIN SCALES - GENERAL: PAINLEVEL_OUTOF10: 0

## 2017-04-24 VITALS
HEIGHT: 64 IN | SYSTOLIC BLOOD PRESSURE: 121 MMHG | HEART RATE: 89 BPM | BODY MASS INDEX: 45.72 KG/M2 | RESPIRATION RATE: 17 BRPM | OXYGEN SATURATION: 99 % | DIASTOLIC BLOOD PRESSURE: 72 MMHG | WEIGHT: 267.8 LBS | TEMPERATURE: 99.6 F

## 2017-04-24 LAB
ABSOLUTE EOS #: 0.3 K/UL (ref 0–0.4)
ABSOLUTE LYMPH #: 0.8 K/UL (ref 1–4.8)
ABSOLUTE MONO #: 0.5 K/UL (ref 0.1–1.2)
BASOPHILS # BLD: 0 %
BASOPHILS ABSOLUTE: 0 K/UL (ref 0–0.2)
DIFFERENTIAL TYPE: ABNORMAL
EOSINOPHILS RELATIVE PERCENT: 6 %
GLUCOSE BLD-MCNC: 106 MG/DL (ref 65–105)
GLUCOSE BLD-MCNC: 91 MG/DL (ref 65–105)
HCT VFR BLD CALC: 26.7 % (ref 36–46)
HEMOGLOBIN: 8.8 G/DL (ref 12–16)
LV EF: 55 %
LVEF MODALITY: NORMAL
LYMPHOCYTES # BLD: 15 %
MCH RBC QN AUTO: 31.3 PG (ref 26–34)
MCHC RBC AUTO-ENTMCNC: 32.9 G/DL (ref 31–37)
MCV RBC AUTO: 95.3 FL (ref 80–100)
MONOCYTES # BLD: 10 %
PDW BLD-RTO: 15.6 % (ref 12.5–15.4)
PLATELET # BLD: 213 K/UL (ref 140–450)
PLATELET ESTIMATE: ABNORMAL
PMV BLD AUTO: 8.7 FL (ref 6–12)
RBC # BLD: 2.8 M/UL (ref 4–5.2)
RBC # BLD: ABNORMAL 10*6/UL
SEG NEUTROPHILS: 69 %
SEGMENTED NEUTROPHILS ABSOLUTE COUNT: 3.6 K/UL (ref 1.8–7.7)
WBC # BLD: 5.3 K/UL (ref 3.5–11)
WBC # BLD: ABNORMAL 10*3/UL

## 2017-04-24 PROCEDURE — 93306 TTE W/DOPPLER COMPLETE: CPT

## 2017-04-24 PROCEDURE — 85025 COMPLETE CBC W/AUTO DIFF WBC: CPT

## 2017-04-24 PROCEDURE — 2580000003 HC RX 258: Performed by: STUDENT IN AN ORGANIZED HEALTH CARE EDUCATION/TRAINING PROGRAM

## 2017-04-24 PROCEDURE — 99211 OFF/OP EST MAY X REQ PHY/QHP: CPT

## 2017-04-24 PROCEDURE — 36415 COLL VENOUS BLD VENIPUNCTURE: CPT

## 2017-04-24 PROCEDURE — 82947 ASSAY GLUCOSE BLOOD QUANT: CPT

## 2017-04-24 PROCEDURE — 6370000000 HC RX 637 (ALT 250 FOR IP): Performed by: STUDENT IN AN ORGANIZED HEALTH CARE EDUCATION/TRAINING PROGRAM

## 2017-04-24 PROCEDURE — 6360000002 HC RX W HCPCS: Performed by: STUDENT IN AN ORGANIZED HEALTH CARE EDUCATION/TRAINING PROGRAM

## 2017-04-24 PROCEDURE — 99238 HOSP IP/OBS DSCHRG MGMT 30/<: CPT | Performed by: INTERNAL MEDICINE

## 2017-04-24 RX ORDER — LANOLIN ALCOHOL/MO/W.PET/CERES
325 CREAM (GRAM) TOPICAL 2 TIMES DAILY
Qty: 90 TABLET | Refills: 3 | Status: SHIPPED | OUTPATIENT
Start: 2017-04-24

## 2017-04-24 RX ORDER — ERGOCALCIFEROL (VITAMIN D2) 1250 MCG
50000 CAPSULE ORAL WEEKLY
Qty: 7 CAPSULE | Refills: 0 | Status: SHIPPED | OUTPATIENT
Start: 2017-04-24 | End: 2017-06-06

## 2017-04-24 RX ORDER — HYDROCHLOROTHIAZIDE 25 MG/1
25 TABLET ORAL DAILY
Status: DISCONTINUED | OUTPATIENT
Start: 2017-04-24 | End: 2017-04-24 | Stop reason: HOSPADM

## 2017-04-24 RX ORDER — GREEN TEA/HOODIA GORDONII 315-12.5MG
1 CAPSULE ORAL DAILY
Qty: 5 TABLET | Refills: 0 | Status: SHIPPED | OUTPATIENT
Start: 2017-04-24

## 2017-04-24 RX ORDER — ATORVASTATIN CALCIUM 20 MG/1
20 TABLET, FILM COATED ORAL DAILY
Status: DISCONTINUED | OUTPATIENT
Start: 2017-04-24 | End: 2017-04-24 | Stop reason: HOSPADM

## 2017-04-24 RX ORDER — CARVEDILOL 25 MG/1
25 TABLET ORAL 2 TIMES DAILY WITH MEALS
Status: DISCONTINUED | OUTPATIENT
Start: 2017-04-24 | End: 2017-04-24 | Stop reason: HOSPADM

## 2017-04-24 RX ORDER — CLINDAMYCIN HYDROCHLORIDE 150 MG/1
300 CAPSULE ORAL 3 TIMES DAILY
Qty: 30 CAPSULE | Refills: 0 | Status: SHIPPED | OUTPATIENT
Start: 2017-04-24 | End: 2017-04-29

## 2017-04-24 RX ORDER — MULTIVITAMIN WITH FOLIC ACID 400 MCG
1 TABLET ORAL DAILY
Status: DISCONTINUED | OUTPATIENT
Start: 2017-04-24 | End: 2017-04-24 | Stop reason: HOSPADM

## 2017-04-24 RX ORDER — LISINOPRIL AND HYDROCHLOROTHIAZIDE 20; 12.5 MG/1; MG/1
2 TABLET ORAL DAILY
Status: DISCONTINUED | OUTPATIENT
Start: 2017-04-24 | End: 2017-04-24 | Stop reason: CLARIF

## 2017-04-24 RX ORDER — LISINOPRIL 20 MG/1
40 TABLET ORAL DAILY
Status: DISCONTINUED | OUTPATIENT
Start: 2017-04-24 | End: 2017-04-24 | Stop reason: HOSPADM

## 2017-04-24 RX ORDER — MINERAL OIL/I-PROP MYR/WATER
LOTION (ML) TOPICAL
Qty: 1 BOTTLE | Refills: 0 | Status: SHIPPED | OUTPATIENT
Start: 2017-04-24

## 2017-04-24 RX ADMIN — Medication: at 01:25

## 2017-04-24 RX ADMIN — THERA TABS 1 TABLET: TAB at 11:26

## 2017-04-24 RX ADMIN — LAMOTRIGINE 100 MG: 100 TABLET ORAL at 11:26

## 2017-04-24 RX ADMIN — HYDROCHLOROTHIAZIDE 25 MG: 25 TABLET ORAL at 11:27

## 2017-04-24 RX ADMIN — LISINOPRIL 40 MG: 20 TABLET ORAL at 11:26

## 2017-04-24 RX ADMIN — CARBAMAZEPINE 100 MG: 100 TABLET, CHEWABLE ORAL at 11:26

## 2017-04-24 RX ADMIN — VENLAFAXINE HYDROCHLORIDE 37.5 MG: 37.5 TABLET ORAL at 11:26

## 2017-04-24 RX ADMIN — IRON SUCROSE 200 MG: 20 INJECTION, SOLUTION INTRAVENOUS at 11:32

## 2017-04-24 RX ADMIN — OLANZAPINE 2.5 MG: 5 TABLET, FILM COATED ORAL at 11:25

## 2017-04-24 RX ADMIN — LEVOTHYROXINE SODIUM 150 MCG: 75 TABLET ORAL at 06:24

## 2017-04-24 RX ADMIN — Medication: at 11:26

## 2017-04-24 RX ADMIN — CARVEDILOL 25 MG: 25 TABLET, FILM COATED ORAL at 11:27

## 2017-04-24 RX ADMIN — Medication 1 TABLET: at 11:32

## 2017-04-24 RX ADMIN — HEPARIN SODIUM 5000 UNITS: 5000 INJECTION, SOLUTION INTRAVENOUS; SUBCUTANEOUS at 06:31

## 2017-04-24 RX ADMIN — ATORVASTATIN CALCIUM 20 MG: 20 TABLET, FILM COATED ORAL at 11:27

## 2017-04-24 ASSESSMENT — PAIN SCALES - GENERAL: PAINLEVEL_OUTOF10: 0

## 2017-04-26 LAB
CULTURE: NORMAL
EKG ATRIAL RATE: 79 BPM
EKG P AXIS: 24 DEGREES
EKG P-R INTERVAL: 126 MS
EKG Q-T INTERVAL: 376 MS
EKG QRS DURATION: 88 MS
EKG QTC CALCULATION (BAZETT): 431 MS
EKG R AXIS: 2 DEGREES
EKG T AXIS: 56 DEGREES
EKG VENTRICULAR RATE: 79 BPM
Lab: NORMAL
Lab: NORMAL
SPECIMEN DESCRIPTION: NORMAL
SPECIMEN DESCRIPTION: NORMAL
STATUS: NORMAL
STATUS: NORMAL

## 2017-05-04 ENCOUNTER — OFFICE VISIT (OUTPATIENT)
Dept: WOUND CARE | Age: 67
End: 2017-05-04
Payer: MEDICARE

## 2017-05-04 ENCOUNTER — HOSPITAL ENCOUNTER (OUTPATIENT)
Age: 67
Discharge: HOME | End: 2017-05-04
Payer: MEDICARE

## 2017-05-04 VITALS
HEIGHT: 64 IN | DIASTOLIC BLOOD PRESSURE: 74 MMHG | WEIGHT: 254.1 LBS | SYSTOLIC BLOOD PRESSURE: 120 MMHG | TEMPERATURE: 96.8 F | HEART RATE: 93 BPM | BODY MASS INDEX: 43.38 KG/M2 | RESPIRATION RATE: 20 BRPM

## 2017-05-04 DIAGNOSIS — L49.1 DERMATITIS, EXFOLIATIVE FROM ERYTHEMATOUS CONDITION ON 10-19% BODY SURFACE: ICD-10-CM

## 2017-05-04 DIAGNOSIS — L03.116 CELLULITIS OF LEFT LOWER EXTREMITY: Primary | ICD-10-CM

## 2017-05-04 PROCEDURE — 1123F ACP DISCUSS/DSCN MKR DOCD: CPT | Performed by: PODIATRIST

## 2017-05-04 PROCEDURE — G8427 DOCREV CUR MEDS BY ELIG CLIN: HCPCS | Performed by: PODIATRIST

## 2017-05-04 PROCEDURE — 4040F PNEUMOC VAC/ADMIN/RCVD: CPT | Performed by: PODIATRIST

## 2017-05-04 PROCEDURE — G8417 CALC BMI ABV UP PARAM F/U: HCPCS | Performed by: PODIATRIST

## 2017-05-04 PROCEDURE — 3014F SCREEN MAMMO DOC REV: CPT | Performed by: PODIATRIST

## 2017-05-04 PROCEDURE — 99212 OFFICE O/P EST SF 10 MIN: CPT | Performed by: PODIATRIST

## 2017-05-04 PROCEDURE — 99213 OFFICE O/P EST LOW 20 MIN: CPT

## 2017-05-04 PROCEDURE — 3017F COLORECTAL CA SCREEN DOC REV: CPT | Performed by: PODIATRIST

## 2017-05-04 PROCEDURE — 1090F PRES/ABSN URINE INCON ASSESS: CPT | Performed by: PODIATRIST

## 2017-05-04 PROCEDURE — 1036F TOBACCO NON-USER: CPT | Performed by: PODIATRIST

## 2017-05-04 PROCEDURE — G8400 PT W/DXA NO RESULTS DOC: HCPCS | Performed by: PODIATRIST

## 2017-05-04 PROCEDURE — 1111F DSCHRG MED/CURRENT MED MERGE: CPT | Performed by: PODIATRIST

## 2017-05-11 ENCOUNTER — HOSPITAL ENCOUNTER (OUTPATIENT)
Age: 67
Setting detail: SPECIMEN
Discharge: HOME | End: 2017-05-11
Payer: MEDICARE

## 2017-05-11 LAB
ANION GAP SERPL CALCULATED.3IONS-SCNC: 12 MMOL/L (ref 9–17)
BUN BLDV-MCNC: 18 MG/DL (ref 8–23)
BUN/CREAT BLD: 20 (ref 9–20)
CALCIUM SERPL-MCNC: 9.4 MG/DL (ref 8.6–10.4)
CHLORIDE BLD-SCNC: 101 MMOL/L (ref 98–107)
CO2: 28 MMOL/L (ref 20–31)
CREAT SERPL-MCNC: 0.89 MG/DL (ref 0.5–0.9)
GFR AFRICAN AMERICAN: >60 ML/MIN
GFR NON-AFRICAN AMERICAN: >60 ML/MIN
GFR SERPL CREATININE-BSD FRML MDRD: ABNORMAL ML/MIN/{1.73_M2}
GFR SERPL CREATININE-BSD FRML MDRD: ABNORMAL ML/MIN/{1.73_M2}
GLUCOSE BLD-MCNC: 104 MG/DL (ref 70–99)
HCT VFR BLD CALC: 30.3 % (ref 36–46)
HEMOGLOBIN: 9.9 G/DL (ref 12–16)
HIGH SENSITIVE C-REACTIVE PROTEIN: 28.3 MG/L
MCH RBC QN AUTO: 30.8 PG (ref 26–34)
MCHC RBC AUTO-ENTMCNC: 32.6 G/DL (ref 31–37)
MCV RBC AUTO: 94.3 FL (ref 80–100)
PDW BLD-RTO: 15.9 % (ref 12.1–15.2)
PLATELET # BLD: 377 K/UL (ref 140–450)
PMV BLD AUTO: ABNORMAL FL (ref 6–12)
POTASSIUM SERPL-SCNC: 4.2 MMOL/L (ref 3.7–5.3)
RBC # BLD: 3.21 M/UL (ref 4–5.2)
SODIUM BLD-SCNC: 141 MMOL/L (ref 135–144)
WBC # BLD: 5.3 K/UL (ref 3.5–11)

## 2017-05-11 PROCEDURE — 80048 BASIC METABOLIC PNL TOTAL CA: CPT

## 2017-05-11 PROCEDURE — 85027 COMPLETE CBC AUTOMATED: CPT

## 2017-05-11 PROCEDURE — 86141 C-REACTIVE PROTEIN HS: CPT

## 2017-05-11 PROCEDURE — 36415 COLL VENOUS BLD VENIPUNCTURE: CPT

## 2017-05-11 PROCEDURE — P9604 ONE-WAY ALLOW PRORATED TRIP: HCPCS

## 2017-05-17 ENCOUNTER — HOSPITAL ENCOUNTER (OUTPATIENT)
Age: 67
Setting detail: SPECIMEN
Discharge: HOME | End: 2017-05-17
Payer: MEDICARE

## 2017-05-17 LAB
HCT VFR BLD CALC: 27.8 % (ref 36–46)
HEMOGLOBIN: 9.1 G/DL (ref 12–16)
HIGH SENSITIVE C-REACTIVE PROTEIN: 14.9 MG/L
MCH RBC QN AUTO: 31 PG (ref 26–34)
MCHC RBC AUTO-ENTMCNC: 32.8 G/DL (ref 31–37)
MCV RBC AUTO: 94.6 FL (ref 80–100)
PDW BLD-RTO: 15.6 % (ref 12.1–15.2)
PLATELET # BLD: 220 K/UL (ref 140–450)
PMV BLD AUTO: ABNORMAL FL (ref 6–12)
RBC # BLD: 2.94 M/UL (ref 4–5.2)
WBC # BLD: 4.9 K/UL (ref 3.5–11)

## 2017-05-17 PROCEDURE — 86141 C-REACTIVE PROTEIN HS: CPT

## 2017-05-17 PROCEDURE — 36415 COLL VENOUS BLD VENIPUNCTURE: CPT

## 2017-05-17 PROCEDURE — P9604 ONE-WAY ALLOW PRORATED TRIP: HCPCS

## 2017-05-17 PROCEDURE — 85027 COMPLETE CBC AUTOMATED: CPT

## 2017-05-24 ENCOUNTER — HOSPITAL ENCOUNTER (OUTPATIENT)
Age: 67
Setting detail: SPECIMEN
Discharge: HOME | End: 2017-05-24
Payer: MEDICARE

## 2017-05-24 LAB
ABSOLUTE EOS #: 0.2 K/UL (ref 0–0.4)
ABSOLUTE LYMPH #: 0.9 K/UL (ref 1–4.8)
ABSOLUTE MONO #: 0.2 K/UL (ref 0–1)
ALT SERPL-CCNC: 10 U/L (ref 5–33)
BASOPHILS # BLD: 0 %
BASOPHILS ABSOLUTE: 0 K/UL (ref 0–0.2)
DIFFERENTIAL TYPE: ABNORMAL
EOSINOPHILS RELATIVE PERCENT: 7 %
HCT VFR BLD CALC: 33.1 % (ref 36–46)
HEMOGLOBIN: 11 G/DL (ref 12–16)
LYMPHOCYTES # BLD: 24 %
MCH RBC QN AUTO: 31 PG (ref 26–34)
MCHC RBC AUTO-ENTMCNC: 33.1 G/DL (ref 31–37)
MCV RBC AUTO: 93.6 FL (ref 80–100)
MONOCYTES # BLD: 6 %
PDW BLD-RTO: 15.4 % (ref 12.1–15.2)
PLATELET # BLD: 241 K/UL (ref 140–450)
PLATELET ESTIMATE: ABNORMAL
PMV BLD AUTO: ABNORMAL FL (ref 6–12)
RBC # BLD: 3.53 M/UL (ref 4–5.2)
RBC # BLD: ABNORMAL 10*6/UL
SEG NEUTROPHILS: 63 %
SEGMENTED NEUTROPHILS ABSOLUTE COUNT: 2.2 K/UL (ref 1.8–7.7)
SODIUM BLD-SCNC: 141 MMOL/L (ref 135–144)
TRIGL SERPL-MCNC: 148 MG/DL
WBC # BLD: 3.6 K/UL (ref 3.5–11)
WBC # BLD: ABNORMAL 10*3/UL

## 2017-05-24 PROCEDURE — 36415 COLL VENOUS BLD VENIPUNCTURE: CPT

## 2017-05-24 PROCEDURE — P9604 ONE-WAY ALLOW PRORATED TRIP: HCPCS

## 2017-05-24 PROCEDURE — 84460 ALANINE AMINO (ALT) (SGPT): CPT

## 2017-05-24 PROCEDURE — 85025 COMPLETE CBC W/AUTO DIFF WBC: CPT

## 2017-05-24 PROCEDURE — 84478 ASSAY OF TRIGLYCERIDES: CPT

## 2017-05-24 PROCEDURE — 84295 ASSAY OF SERUM SODIUM: CPT

## 2017-05-26 ENCOUNTER — HOSPITAL ENCOUNTER (OUTPATIENT)
Age: 67
Setting detail: SPECIMEN
Discharge: HOME | End: 2017-05-26
Payer: MEDICARE

## 2017-05-31 ENCOUNTER — HOSPITAL ENCOUNTER (OUTPATIENT)
Age: 67
Setting detail: SPECIMEN
Discharge: HOME | End: 2017-05-31
Payer: MEDICARE

## 2017-05-31 LAB
CREATININE URINE: 38.1 MG/DL (ref 28–217)
HEPATITIS C ANTIBODY: NONREACTIVE
MICROALBUMIN/CREAT 24H UR: <12 MG/L
MICROALBUMIN/CREAT UR-RTO: 31 MCG/MG CREAT

## 2017-05-31 PROCEDURE — 82570 ASSAY OF URINE CREATININE: CPT

## 2017-05-31 PROCEDURE — 82043 UR ALBUMIN QUANTITATIVE: CPT

## 2017-05-31 PROCEDURE — 36415 COLL VENOUS BLD VENIPUNCTURE: CPT

## 2017-05-31 PROCEDURE — 86803 HEPATITIS C AB TEST: CPT

## 2017-05-31 PROCEDURE — P9604 ONE-WAY ALLOW PRORATED TRIP: HCPCS

## 2017-06-07 ENCOUNTER — HOSPITAL ENCOUNTER (OUTPATIENT)
Dept: BONE DENSITY | Age: 67
Discharge: HOME | End: 2017-06-07
Payer: MEDICARE

## 2017-06-07 DIAGNOSIS — Z13.820 SCREENING FOR OSTEOPOROSIS: ICD-10-CM

## 2017-06-07 PROCEDURE — 77080 DXA BONE DENSITY AXIAL: CPT

## 2017-06-22 ENCOUNTER — HOSPITAL ENCOUNTER (OUTPATIENT)
Age: 67
Setting detail: SPECIMEN
Discharge: HOME | End: 2017-06-22
Payer: MEDICARE

## 2017-06-22 LAB
C-REACTIVE PROTEIN: 9.5 MG/L (ref 0–5)
HCT VFR BLD CALC: 33.3 % (ref 36–46)
HEMOGLOBIN: 11.2 G/DL (ref 12–16)
MCH RBC QN AUTO: 31.3 PG (ref 26–34)
MCHC RBC AUTO-ENTMCNC: 33.7 G/DL (ref 31–37)
MCV RBC AUTO: 92.8 FL (ref 80–100)
PDW BLD-RTO: 14.4 % (ref 12.1–15.2)
PLATELET # BLD: 240 K/UL (ref 140–450)
PMV BLD AUTO: ABNORMAL FL (ref 6–12)
RBC # BLD: 3.59 M/UL (ref 4–5.2)
WBC # BLD: 4.7 K/UL (ref 3.5–11)

## 2017-06-22 PROCEDURE — 85027 COMPLETE CBC AUTOMATED: CPT

## 2017-06-22 PROCEDURE — 36415 COLL VENOUS BLD VENIPUNCTURE: CPT

## 2017-06-22 PROCEDURE — P9604 ONE-WAY ALLOW PRORATED TRIP: HCPCS

## 2017-06-22 PROCEDURE — 86140 C-REACTIVE PROTEIN: CPT

## 2017-08-03 ENCOUNTER — HOSPITAL ENCOUNTER (OUTPATIENT)
Age: 67
Setting detail: SPECIMEN
Discharge: HOME | End: 2017-08-03
Payer: MEDICARE

## 2017-08-03 LAB
ABSOLUTE EOS #: 0.3 K/UL (ref 0–0.4)
ABSOLUTE LYMPH #: 1.1 K/UL (ref 1–4.8)
ABSOLUTE MONO #: 0.5 K/UL (ref 0–1)
ALBUMIN SERPL-MCNC: 3.1 G/DL (ref 3.5–5.2)
ALBUMIN/GLOBULIN RATIO: 0.8 (ref 1–2.5)
ALP BLD-CCNC: 73 U/L (ref 35–104)
ALT SERPL-CCNC: 11 U/L (ref 5–33)
ANION GAP SERPL CALCULATED.3IONS-SCNC: 12 MMOL/L (ref 9–17)
AST SERPL-CCNC: 19 U/L
BASOPHILS # BLD: 0 %
BASOPHILS ABSOLUTE: 0 K/UL (ref 0–0.2)
BILIRUB SERPL-MCNC: <0.1 MG/DL (ref 0.3–1.2)
BUN BLDV-MCNC: 25 MG/DL (ref 8–23)
BUN/CREAT BLD: 23 (ref 9–20)
CALCIUM SERPL-MCNC: 9.3 MG/DL (ref 8.6–10.4)
CHLORIDE BLD-SCNC: 101 MMOL/L (ref 98–107)
CO2: 28 MMOL/L (ref 20–31)
CREAT SERPL-MCNC: 1.08 MG/DL (ref 0.5–0.9)
DIFFERENTIAL TYPE: ABNORMAL
EOSINOPHILS RELATIVE PERCENT: 5 %
ESTIMATED AVERAGE GLUCOSE: 105 MG/DL
GFR AFRICAN AMERICAN: >60 ML/MIN
GFR NON-AFRICAN AMERICAN: 50 ML/MIN
GFR SERPL CREATININE-BSD FRML MDRD: ABNORMAL ML/MIN/{1.73_M2}
GFR SERPL CREATININE-BSD FRML MDRD: ABNORMAL ML/MIN/{1.73_M2}
GLUCOSE BLD-MCNC: 114 MG/DL (ref 70–99)
HBA1C MFR BLD: 5.3 % (ref 4.8–5.9)
HCT VFR BLD CALC: 30.3 % (ref 36–46)
HEMOGLOBIN: 10.3 G/DL (ref 12–16)
LYMPHOCYTES # BLD: 20 %
MCH RBC QN AUTO: 31.4 PG (ref 26–34)
MCHC RBC AUTO-ENTMCNC: 33.9 G/DL (ref 31–37)
MCV RBC AUTO: 92.9 FL (ref 80–100)
MONOCYTES # BLD: 10 %
PDW BLD-RTO: 15.7 % (ref 12.1–15.2)
PLATELET # BLD: 236 K/UL (ref 140–450)
PLATELET ESTIMATE: ABNORMAL
PMV BLD AUTO: 9.2 FL (ref 6–12)
POTASSIUM SERPL-SCNC: 4.3 MMOL/L (ref 3.7–5.3)
RBC # BLD: 3.26 M/UL (ref 4–5.2)
RBC # BLD: ABNORMAL 10*6/UL
SEG NEUTROPHILS: 65 %
SEGMENTED NEUTROPHILS ABSOLUTE COUNT: 3.5 K/UL (ref 1.8–7.7)
SODIUM BLD-SCNC: 141 MMOL/L (ref 135–144)
THYROXINE, FREE: 1.01 NG/DL (ref 0.93–1.7)
TOTAL PROTEIN: 6.9 G/DL (ref 6.4–8.3)
TSH SERPL DL<=0.05 MIU/L-ACNC: 13.66 MIU/L (ref 0.3–5)
WBC # BLD: 5.4 K/UL (ref 3.5–11)
WBC # BLD: ABNORMAL 10*3/UL

## 2017-08-03 PROCEDURE — 36415 COLL VENOUS BLD VENIPUNCTURE: CPT

## 2017-08-03 PROCEDURE — 84443 ASSAY THYROID STIM HORMONE: CPT

## 2017-08-03 PROCEDURE — P9604 ONE-WAY ALLOW PRORATED TRIP: HCPCS

## 2017-08-03 PROCEDURE — 84439 ASSAY OF FREE THYROXINE: CPT

## 2017-08-03 PROCEDURE — 80053 COMPREHEN METABOLIC PANEL: CPT

## 2017-08-03 PROCEDURE — 83036 HEMOGLOBIN GLYCOSYLATED A1C: CPT

## 2017-08-03 PROCEDURE — 85025 COMPLETE CBC W/AUTO DIFF WBC: CPT

## 2017-09-03 ENCOUNTER — HOSPITAL ENCOUNTER (EMERGENCY)
Age: 67
Discharge: PSYCHIATRIC OR SUBSTANCE USE FACILITY | End: 2017-09-03
Attending: EMERGENCY MEDICINE
Payer: MEDICARE

## 2017-09-03 ENCOUNTER — APPOINTMENT (OUTPATIENT)
Dept: GENERAL RADIOLOGY | Age: 67
End: 2017-09-03
Payer: MEDICARE

## 2017-09-03 ENCOUNTER — APPOINTMENT (OUTPATIENT)
Dept: CT IMAGING | Age: 67
End: 2017-09-03
Payer: MEDICARE

## 2017-09-03 VITALS
BODY MASS INDEX: 38.62 KG/M2 | DIASTOLIC BLOOD PRESSURE: 100 MMHG | WEIGHT: 225 LBS | OXYGEN SATURATION: 95 % | RESPIRATION RATE: 17 BRPM | TEMPERATURE: 98.8 F | SYSTOLIC BLOOD PRESSURE: 162 MMHG | HEART RATE: 87 BPM

## 2017-09-03 DIAGNOSIS — F23 ACUTE PSYCHOSIS (HCC): Primary | ICD-10-CM

## 2017-09-03 DIAGNOSIS — F31.9 BIPOLAR 1 DISORDER (HCC): ICD-10-CM

## 2017-09-03 LAB
-: NORMAL
ABSOLUTE EOS #: 0.1 K/UL (ref 0–0.4)
ABSOLUTE LYMPH #: 1.2 K/UL (ref 1–4.8)
ABSOLUTE MONO #: 0.5 K/UL (ref 0–1)
ALBUMIN SERPL-MCNC: 4.4 G/DL (ref 3.5–5.2)
ALBUMIN/GLOBULIN RATIO: 1.1 (ref 1–2.5)
ALP BLD-CCNC: 60 U/L (ref 35–104)
ALT SERPL-CCNC: 13 U/L (ref 5–33)
AMMONIA: 26 UMOL/L (ref 11–51)
AMORPHOUS: NORMAL
AMPHETAMINE SCREEN URINE: NEGATIVE
ANION GAP SERPL CALCULATED.3IONS-SCNC: 14 MMOL/L (ref 9–17)
AST SERPL-CCNC: 20 U/L
BACTERIA: NORMAL
BARBITURATE SCREEN URINE: NEGATIVE
BASOPHILS # BLD: 0 %
BASOPHILS ABSOLUTE: 0 K/UL (ref 0–0.2)
BENZODIAZEPINE SCREEN, URINE: NEGATIVE
BILIRUB SERPL-MCNC: 0.38 MG/DL (ref 0.3–1.2)
BILIRUBIN URINE: NEGATIVE
BUN BLDV-MCNC: 34 MG/DL (ref 8–23)
BUN/CREAT BLD: 31 (ref 9–20)
BUPRENORPHINE URINE: NEGATIVE
CALCIUM SERPL-MCNC: 10.4 MG/DL (ref 8.6–10.4)
CANNABINOID SCREEN URINE: NEGATIVE
CARBAMAZEPINE DATE LAST DOSE: ABNORMAL
CARBAMAZEPINE DOSE AMOUNT: ABNORMAL
CARBAMAZEPINE DOSE TIME: ABNORMAL
CARBAMAZEPINE LEVEL: 2.8 UG/ML (ref 8–12)
CASTS UA: NORMAL /LPF
CHLORIDE BLD-SCNC: 97 MMOL/L (ref 98–107)
CO2: 32 MMOL/L (ref 20–31)
COCAINE METABOLITE, URINE: NEGATIVE
COLOR: YELLOW
COMMENT UA: ABNORMAL
CREAT SERPL-MCNC: 1.08 MG/DL (ref 0.5–0.9)
CRYSTALS, UA: NORMAL /HPF
DIFFERENTIAL TYPE: ABNORMAL
EOSINOPHILS RELATIVE PERCENT: 1 %
EPITHELIAL CELLS UA: NORMAL /HPF (ref 0–25)
ETHANOL PERCENT: <0.01 %
ETHANOL: <10 MG/DL
GFR AFRICAN AMERICAN: >60 ML/MIN
GFR NON-AFRICAN AMERICAN: 50 ML/MIN
GFR SERPL CREATININE-BSD FRML MDRD: ABNORMAL ML/MIN/{1.73_M2}
GFR SERPL CREATININE-BSD FRML MDRD: ABNORMAL ML/MIN/{1.73_M2}
GLUCOSE BLD-MCNC: 138 MG/DL (ref 70–99)
GLUCOSE URINE: NEGATIVE
HCT VFR BLD CALC: 43 % (ref 36–46)
HEMOGLOBIN: 14.4 G/DL (ref 12–16)
KETONES, URINE: ABNORMAL
LEUKOCYTE ESTERASE, URINE: NEGATIVE
LYMPHOCYTES # BLD: 15 %
MCH RBC QN AUTO: 31.3 PG (ref 26–34)
MCHC RBC AUTO-ENTMCNC: 33.6 G/DL (ref 31–37)
MCV RBC AUTO: 93.2 FL (ref 80–100)
MDMA URINE: NORMAL
METHADONE SCREEN, URINE: NEGATIVE
METHAMPHETAMINE, URINE: NEGATIVE
MONOCYTES # BLD: 6 %
MUCUS: NORMAL
NITRITE, URINE: NEGATIVE
OPIATES, URINE: NEGATIVE
OTHER OBSERVATIONS UA: NORMAL
OXYCODONE SCREEN URINE: NEGATIVE
PDW BLD-RTO: 15.6 % (ref 12.1–15.2)
PH UA: 6 (ref 5–9)
PHENCYCLIDINE, URINE: NEGATIVE
PLATELET # BLD: 253 K/UL (ref 140–450)
PLATELET ESTIMATE: ABNORMAL
PMV BLD AUTO: 9.1 FL (ref 6–12)
POTASSIUM SERPL-SCNC: 3.4 MMOL/L (ref 3.7–5.3)
PROPOXYPHENE, URINE: NEGATIVE
PROTEIN UA: ABNORMAL
RBC # BLD: 4.62 M/UL (ref 4–5.2)
RBC # BLD: ABNORMAL 10*6/UL
RBC UA: NORMAL /HPF (ref 0–2)
RENAL EPITHELIAL, UA: NORMAL /HPF
SEG NEUTROPHILS: 78 %
SEGMENTED NEUTROPHILS ABSOLUTE COUNT: 6.4 K/UL (ref 1.8–7.7)
SODIUM BLD-SCNC: 143 MMOL/L (ref 135–144)
SPECIFIC GRAVITY UA: 1.02 (ref 1.01–1.02)
TEST INFORMATION: NORMAL
TOTAL PROTEIN: 8.3 G/DL (ref 6.4–8.3)
TRICHOMONAS: NORMAL
TRICYCLIC ANTIDEPRESSANTS, UR: NEGATIVE
TROPONIN INTERP: NORMAL
TROPONIN T: <0.03 NG/ML
TSH SERPL DL<=0.05 MIU/L-ACNC: 2.66 MIU/L (ref 0.3–5)
TURBIDITY: CLEAR
URINE HGB: NEGATIVE
UROBILINOGEN, URINE: NORMAL
WBC # BLD: 8.2 K/UL (ref 3.5–11)
WBC # BLD: ABNORMAL 10*3/UL
WBC UA: NORMAL /HPF (ref 0–5)
YEAST: NORMAL

## 2017-09-03 PROCEDURE — 96374 THER/PROPH/DIAG INJ IV PUSH: CPT

## 2017-09-03 PROCEDURE — 71010 XR CHEST PORTABLE: CPT

## 2017-09-03 PROCEDURE — 80156 ASSAY CARBAMAZEPINE TOTAL: CPT

## 2017-09-03 PROCEDURE — 85025 COMPLETE CBC W/AUTO DIFF WBC: CPT

## 2017-09-03 PROCEDURE — 80053 COMPREHEN METABOLIC PANEL: CPT

## 2017-09-03 PROCEDURE — 6370000000 HC RX 637 (ALT 250 FOR IP): Performed by: EMERGENCY MEDICINE

## 2017-09-03 PROCEDURE — 84443 ASSAY THYROID STIM HORMONE: CPT

## 2017-09-03 PROCEDURE — G0480 DRUG TEST DEF 1-7 CLASSES: HCPCS

## 2017-09-03 PROCEDURE — 82140 ASSAY OF AMMONIA: CPT

## 2017-09-03 PROCEDURE — 70450 CT HEAD/BRAIN W/O DYE: CPT

## 2017-09-03 PROCEDURE — 84484 ASSAY OF TROPONIN QUANT: CPT

## 2017-09-03 PROCEDURE — 99285 EMERGENCY DEPT VISIT HI MDM: CPT

## 2017-09-03 PROCEDURE — 2500000003 HC RX 250 WO HCPCS: Performed by: EMERGENCY MEDICINE

## 2017-09-03 PROCEDURE — 93005 ELECTROCARDIOGRAM TRACING: CPT

## 2017-09-03 PROCEDURE — 80306 DRUG TEST PRSMV INSTRMNT: CPT

## 2017-09-03 PROCEDURE — 36415 COLL VENOUS BLD VENIPUNCTURE: CPT

## 2017-09-03 PROCEDURE — 2580000003 HC RX 258: Performed by: EMERGENCY MEDICINE

## 2017-09-03 PROCEDURE — 81001 URINALYSIS AUTO W/SCOPE: CPT

## 2017-09-03 RX ORDER — HYDROXYZINE HYDROCHLORIDE 10 MG/1
10 TABLET, FILM COATED ORAL 3 TIMES DAILY PRN
COMMUNITY

## 2017-09-03 RX ORDER — POTASSIUM CHLORIDE 750 MG/1
20 TABLET, FILM COATED, EXTENDED RELEASE ORAL ONCE
Status: DISCONTINUED | OUTPATIENT
Start: 2017-09-03 | End: 2017-09-04 | Stop reason: HOSPADM

## 2017-09-03 RX ORDER — PREDNISONE 10 MG/1
10 TABLET ORAL EVERY MORNING
Status: ON HOLD | COMMUNITY
End: 2020-10-07 | Stop reason: ALTCHOICE

## 2017-09-03 RX ORDER — SODIUM CHLORIDE 9 MG/ML
100 INJECTION, SOLUTION INTRAVENOUS CONTINUOUS
Status: DISCONTINUED | OUTPATIENT
Start: 2017-09-03 | End: 2017-09-04 | Stop reason: HOSPADM

## 2017-09-03 RX ORDER — TRAMADOL HYDROCHLORIDE 50 MG/1
50 TABLET ORAL EVERY 8 HOURS
COMMUNITY
End: 2022-06-21

## 2017-09-03 RX ORDER — LANOLIN ALCOHOL/MO/W.PET/CERES
3 CREAM (GRAM) TOPICAL EVERY EVENING
COMMUNITY

## 2017-09-03 RX ORDER — CARVEDILOL 25 MG/1
25 TABLET ORAL ONCE
Status: COMPLETED | OUTPATIENT
Start: 2017-09-03 | End: 2017-09-03

## 2017-09-03 RX ORDER — METOPROLOL TARTRATE 5 MG/5ML
2.5 INJECTION INTRAVENOUS ONCE
Status: COMPLETED | OUTPATIENT
Start: 2017-09-03 | End: 2017-09-03

## 2017-09-03 RX ORDER — ACETAMINOPHEN 325 MG/1
650 TABLET ORAL EVERY 4 HOURS PRN
COMMUNITY

## 2017-09-03 RX ADMIN — SODIUM CHLORIDE 500 ML: 9 INJECTION, SOLUTION INTRAVENOUS at 15:24

## 2017-09-03 RX ADMIN — METOPROLOL TARTRATE 2.5 MG: 5 INJECTION INTRAVENOUS at 19:02

## 2017-09-03 RX ADMIN — CARVEDILOL 25 MG: 25 TABLET, FILM COATED ORAL at 18:40

## 2017-09-06 LAB
EKG ATRIAL RATE: 96 BPM
EKG P AXIS: 55 DEGREES
EKG P-R INTERVAL: 150 MS
EKG Q-T INTERVAL: 382 MS
EKG QRS DURATION: 86 MS
EKG QTC CALCULATION (BAZETT): 482 MS
EKG R AXIS: -12 DEGREES
EKG T AXIS: 80 DEGREES
EKG VENTRICULAR RATE: 96 BPM

## 2017-10-12 ENCOUNTER — HOSPITAL ENCOUNTER (OUTPATIENT)
Dept: WOUND CARE | Age: 67
Discharge: HOME | End: 2017-10-12
Payer: MEDICARE

## 2017-10-12 VITALS
BODY MASS INDEX: 38.22 KG/M2 | DIASTOLIC BLOOD PRESSURE: 87 MMHG | TEMPERATURE: 98.5 F | RESPIRATION RATE: 18 BRPM | WEIGHT: 237.8 LBS | SYSTOLIC BLOOD PRESSURE: 138 MMHG | HEIGHT: 66 IN | HEART RATE: 108 BPM

## 2017-10-12 DIAGNOSIS — S80.812A: ICD-10-CM

## 2017-10-12 PROBLEM — A41.52 SEPSIS DUE TO PSEUDOMONAS SPECIES (HCC): Status: RESOLVED | Noted: 2017-03-26 | Resolved: 2017-10-12

## 2017-10-12 PROCEDURE — 99212 OFFICE O/P EST SF 10 MIN: CPT | Performed by: PODIATRIST

## 2017-10-12 PROCEDURE — 99213 OFFICE O/P EST LOW 20 MIN: CPT

## 2017-10-12 NOTE — PROGRESS NOTES
morning       predniSONE (DELTASONE) 10 MG tablet Take 10 mg by mouth every morning      hydrOXYzine (ATARAX) 10 MG tablet Take 10 mg by mouth 3 times daily as needed for Itching      hydrocerin (EUCERIN) lotion 2 times daily for dry skin 1 Bottle 0    vitamin D (ERGOCALCIFEROL) 17096 UNITS capsule Take 1 capsule by mouth once a week for 7 doses 7 capsule 0    sodium hypochlorite (DAKINS) 0.2-0.25 % external solution Apply 1 Bottle topically once Apply topically once.  HYDROcodone-acetaminophen (NORCO) 5-325 MG per tablet Take 1 tablet by mouth every 6 hours as needed for Pain .  diclofenac sodium 1 % GEL Apply 2 g topically three times daily      magnesium hydroxide (MILK OF MAGNESIA) 400 MG/5ML suspension Take 30 mLs by mouth daily as needed for Constipation      dextromethorphan-guaiFENesin (MUCINEX DM)  MG per extended release tablet Take 1 tablet by mouth every 12 hours as needed      Sodium Phosphates (FLEET) 7-19 GM/118ML Place 1 enema rectally once as needed      dextromethorphan (DELSYM) 30 MG/5ML extended release liquid Take 30 mg by mouth 2 times daily as needed for Cough       meloxicam (MOBIC) 7.5 MG tablet Take 7.5 mg by mouth daily      carBAMazepine (TEGRETOL) 100 MG chewable tablet Take 100 mg by mouth daily        No current facility-administered medications on file prior to encounter.         REVIEW OF SYSTEMS    A comprehensive review of systems was negative except for: Constitutional: positive for malaise  Integument/breast: positive for dryness, pruritus, skin lesion(s) and excoriations   Hematologic/lymphatic: positive for lymohedema   Endocrine: positive for diabetic symptoms including blurry vision and neuropathy    Objective:      /87   Pulse 108   Temp 98.5 °F (36.9 °C) (Tympanic)   Resp 18   Ht 5' 6\" (1.676 m)   Wt 237 lb 12.8 oz (107.9 kg)   BMI 38.38 kg/m²     Wt Readings from Last 3 Encounters:   10/12/17 237 lb 12.8 oz (107.9 kg)   09/03/17 225 lb

## 2017-10-25 ENCOUNTER — HOSPITAL ENCOUNTER (OUTPATIENT)
Age: 67
Setting detail: SPECIMEN
Discharge: HOME | End: 2017-10-25
Payer: MEDICARE

## 2017-10-25 LAB — TSH SERPL DL<=0.05 MIU/L-ACNC: 1.61 MIU/L (ref 0.3–5)

## 2017-10-25 PROCEDURE — P9604 ONE-WAY ALLOW PRORATED TRIP: HCPCS

## 2017-10-25 PROCEDURE — 84443 ASSAY THYROID STIM HORMONE: CPT

## 2017-10-25 PROCEDURE — 36415 COLL VENOUS BLD VENIPUNCTURE: CPT

## 2017-11-09 ENCOUNTER — HOSPITAL ENCOUNTER (OUTPATIENT)
Dept: WOUND CARE | Age: 67
Discharge: HOME | End: 2017-11-09

## 2017-11-16 ENCOUNTER — HOSPITAL ENCOUNTER (OUTPATIENT)
Age: 67
Setting detail: SPECIMEN
Discharge: HOME | End: 2017-11-16
Payer: MEDICARE

## 2017-11-16 LAB
ABSOLUTE EOS #: 0.2 K/UL (ref 0–0.4)
ABSOLUTE IMMATURE GRANULOCYTE: NORMAL K/UL (ref 0–0.3)
ABSOLUTE LYMPH #: 1.9 K/UL (ref 1–4.8)
ABSOLUTE MONO #: 0.5 K/UL (ref 0–1)
ALT SERPL-CCNC: 11 U/L (ref 5–33)
BASOPHILS # BLD: 0 %
BASOPHILS ABSOLUTE: 0 K/UL (ref 0–0.2)
DIFFERENTIAL TYPE: NORMAL
EOSINOPHILS RELATIVE PERCENT: 3 %
HCT VFR BLD CALC: 40.1 % (ref 36–46)
HEMOGLOBIN: 13.2 G/DL (ref 12–16)
IMMATURE GRANULOCYTES: NORMAL %
LYMPHOCYTES # BLD: 34 %
MCH RBC QN AUTO: 31.9 PG (ref 26–34)
MCHC RBC AUTO-ENTMCNC: 32.9 G/DL (ref 31–37)
MCV RBC AUTO: 96.9 FL (ref 80–100)
MONOCYTES # BLD: 9 %
PDW BLD-RTO: 14.1 % (ref 12.1–15.2)
PLATELET # BLD: 284 K/UL (ref 140–450)
PLATELET ESTIMATE: NORMAL
PMV BLD AUTO: 9.5 FL (ref 6–12)
RBC # BLD: 4.14 M/UL (ref 4–5.2)
RBC # BLD: NORMAL 10*6/UL
SEG NEUTROPHILS: 54 %
SEGMENTED NEUTROPHILS ABSOLUTE COUNT: 2.9 K/UL (ref 1.8–7.7)
SODIUM BLD-SCNC: 143 MMOL/L (ref 135–144)
TRIGL SERPL-MCNC: 130 MG/DL
WBC # BLD: 5.5 K/UL (ref 3.5–11)
WBC # BLD: NORMAL 10*3/UL

## 2017-11-16 PROCEDURE — 36415 COLL VENOUS BLD VENIPUNCTURE: CPT

## 2017-11-16 PROCEDURE — P9604 ONE-WAY ALLOW PRORATED TRIP: HCPCS

## 2017-11-16 PROCEDURE — 85025 COMPLETE CBC W/AUTO DIFF WBC: CPT

## 2017-11-16 PROCEDURE — 84295 ASSAY OF SERUM SODIUM: CPT

## 2017-11-16 PROCEDURE — 84478 ASSAY OF TRIGLYCERIDES: CPT

## 2017-11-16 PROCEDURE — 84460 ALANINE AMINO (ALT) (SGPT): CPT

## 2017-11-27 PROBLEM — A41.9 SEPSIS (HCC): Status: RESOLVED | Noted: 2017-04-22 | Resolved: 2017-11-27

## 2017-11-28 ENCOUNTER — HOSPITAL ENCOUNTER (OUTPATIENT)
Age: 67
Setting detail: SPECIMEN
Discharge: HOME | End: 2017-11-28
Payer: MEDICARE

## 2017-11-30 ENCOUNTER — HOSPITAL ENCOUNTER (OUTPATIENT)
Age: 67
Setting detail: SPECIMEN
Discharge: HOME | End: 2017-11-30
Payer: MEDICARE

## 2017-12-04 ENCOUNTER — HOSPITAL ENCOUNTER (OUTPATIENT)
Age: 67
Setting detail: SPECIMEN
Discharge: HOME | End: 2017-12-04
Payer: MEDICARE

## 2017-12-14 ENCOUNTER — HOSPITAL ENCOUNTER (OUTPATIENT)
Age: 67
Setting detail: SPECIMEN
Discharge: HOME | End: 2017-12-14
Payer: MEDICARE

## 2017-12-14 LAB — TSH SERPL DL<=0.05 MIU/L-ACNC: 0.4 MIU/L (ref 0.3–5)

## 2017-12-14 PROCEDURE — 36415 COLL VENOUS BLD VENIPUNCTURE: CPT

## 2017-12-14 PROCEDURE — P9604 ONE-WAY ALLOW PRORATED TRIP: HCPCS

## 2017-12-14 PROCEDURE — 84443 ASSAY THYROID STIM HORMONE: CPT

## 2018-02-16 ENCOUNTER — HOSPITAL ENCOUNTER (OUTPATIENT)
Age: 68
Setting detail: SPECIMEN
Discharge: HOME | End: 2018-02-16
Payer: MEDICARE

## 2018-02-16 LAB
ANION GAP SERPL CALCULATED.3IONS-SCNC: 15 MMOL/L (ref 9–17)
BUN BLDV-MCNC: 29 MG/DL (ref 8–23)
BUN/CREAT BLD: 18 (ref 9–20)
CALCIUM SERPL-MCNC: 10.6 MG/DL (ref 8.6–10.4)
CHLORIDE BLD-SCNC: 92 MMOL/L (ref 98–107)
CO2: 30 MMOL/L (ref 20–31)
CREAT SERPL-MCNC: 1.58 MG/DL (ref 0.5–0.9)
GFR AFRICAN AMERICAN: 39 ML/MIN
GFR NON-AFRICAN AMERICAN: 32 ML/MIN
GFR SERPL CREATININE-BSD FRML MDRD: ABNORMAL ML/MIN/{1.73_M2}
GFR SERPL CREATININE-BSD FRML MDRD: ABNORMAL ML/MIN/{1.73_M2}
GLUCOSE BLD-MCNC: 91 MG/DL (ref 70–99)
POTASSIUM SERPL-SCNC: 3.5 MMOL/L (ref 3.7–5.3)
SODIUM BLD-SCNC: 137 MMOL/L (ref 135–144)
TSH SERPL DL<=0.05 MIU/L-ACNC: 13.81 MIU/L (ref 0.3–5)

## 2018-02-16 PROCEDURE — 84443 ASSAY THYROID STIM HORMONE: CPT

## 2018-02-16 PROCEDURE — P9604 ONE-WAY ALLOW PRORATED TRIP: HCPCS

## 2018-02-16 PROCEDURE — 80048 BASIC METABOLIC PNL TOTAL CA: CPT

## 2018-02-16 PROCEDURE — 36415 COLL VENOUS BLD VENIPUNCTURE: CPT

## 2018-03-20 ENCOUNTER — HOSPITAL ENCOUNTER (OUTPATIENT)
Age: 68
Setting detail: SPECIMEN
Discharge: HOME | End: 2018-03-20
Payer: MEDICARE

## 2018-03-20 LAB
ABSOLUTE EOS #: 0.1 K/UL (ref 0–0.44)
ABSOLUTE IMMATURE GRANULOCYTE: <0.03 K/UL (ref 0–0.3)
ABSOLUTE LYMPH #: 1.54 K/UL (ref 1.1–3.7)
ABSOLUTE MONO #: 0.49 K/UL (ref 0.1–1.2)
ALBUMIN SERPL-MCNC: 3.4 G/DL (ref 3.5–5.2)
ALBUMIN/GLOBULIN RATIO: 1.1 (ref 1–2.5)
ALP BLD-CCNC: 58 U/L (ref 35–104)
ALT SERPL-CCNC: 22 U/L (ref 5–33)
ANION GAP SERPL CALCULATED.3IONS-SCNC: 9 MMOL/L (ref 9–17)
AST SERPL-CCNC: 18 U/L
BASOPHILS # BLD: 0 % (ref 0–2)
BASOPHILS ABSOLUTE: <0.03 K/UL (ref 0–0.2)
BILIRUB SERPL-MCNC: <0.1 MG/DL (ref 0.3–1.2)
BUN BLDV-MCNC: 19 MG/DL (ref 8–23)
BUN/CREAT BLD: 20 (ref 9–20)
CALCIUM SERPL-MCNC: 9.3 MG/DL (ref 8.6–10.4)
CHLORIDE BLD-SCNC: 100 MMOL/L (ref 98–107)
CHOLESTEROL/HDL RATIO: 2.8
CHOLESTEROL: 124 MG/DL
CO2: 32 MMOL/L (ref 20–31)
CREAT SERPL-MCNC: 0.93 MG/DL (ref 0.5–0.9)
DIFFERENTIAL TYPE: ABNORMAL
EOSINOPHILS RELATIVE PERCENT: 2 % (ref 1–4)
ESTIMATED AVERAGE GLUCOSE: 103 MG/DL
GFR AFRICAN AMERICAN: >60 ML/MIN
GFR NON-AFRICAN AMERICAN: 60 ML/MIN
GFR SERPL CREATININE-BSD FRML MDRD: ABNORMAL ML/MIN/{1.73_M2}
GFR SERPL CREATININE-BSD FRML MDRD: ABNORMAL ML/MIN/{1.73_M2}
GLUCOSE BLD-MCNC: 89 MG/DL (ref 70–99)
HBA1C MFR BLD: 5.2 % (ref 4.8–5.9)
HCT VFR BLD CALC: 34.2 % (ref 36.3–47.1)
HDLC SERPL-MCNC: 45 MG/DL
HEMOGLOBIN: 11.1 G/DL (ref 11.9–15.1)
IMMATURE GRANULOCYTES: 0 %
LDL CHOLESTEROL: 50 MG/DL (ref 0–130)
LYMPHOCYTES # BLD: 28 % (ref 24–43)
MCH RBC QN AUTO: 32.6 PG (ref 25.2–33.5)
MCHC RBC AUTO-ENTMCNC: 32.5 G/DL (ref 28.4–34.8)
MCV RBC AUTO: 100.3 FL (ref 82.6–102.9)
MONOCYTES # BLD: 9 % (ref 3–12)
NRBC AUTOMATED: 0 PER 100 WBC
PDW BLD-RTO: 12.9 % (ref 11.8–14.4)
PLATELET # BLD: 270 K/UL (ref 138–453)
PLATELET ESTIMATE: ABNORMAL
PMV BLD AUTO: 10.5 FL (ref 8.1–13.5)
POTASSIUM SERPL-SCNC: 4 MMOL/L (ref 3.7–5.3)
RBC # BLD: 3.41 M/UL (ref 3.95–5.11)
RBC # BLD: ABNORMAL 10*6/UL
SEG NEUTROPHILS: 60 % (ref 36–65)
SEGMENTED NEUTROPHILS ABSOLUTE COUNT: 3.29 K/UL (ref 1.5–8.1)
SODIUM BLD-SCNC: 141 MMOL/L (ref 135–144)
THYROXINE, FREE: 1.75 NG/DL (ref 0.93–1.7)
TOTAL PROTEIN: 6.4 G/DL (ref 6.4–8.3)
TRIGL SERPL-MCNC: 144 MG/DL
TSH SERPL DL<=0.05 MIU/L-ACNC: 0.23 MIU/L (ref 0.3–5)
VLDLC SERPL CALC-MCNC: NORMAL MG/DL (ref 1–30)
WBC # BLD: 5.5 K/UL (ref 3.5–11.3)
WBC # BLD: ABNORMAL 10*3/UL

## 2018-03-20 PROCEDURE — P9604 ONE-WAY ALLOW PRORATED TRIP: HCPCS

## 2018-03-20 PROCEDURE — 80061 LIPID PANEL: CPT

## 2018-03-20 PROCEDURE — 84443 ASSAY THYROID STIM HORMONE: CPT

## 2018-03-20 PROCEDURE — 83036 HEMOGLOBIN GLYCOSYLATED A1C: CPT

## 2018-03-20 PROCEDURE — 84439 ASSAY OF FREE THYROXINE: CPT

## 2018-03-20 PROCEDURE — 80053 COMPREHEN METABOLIC PANEL: CPT

## 2018-03-20 PROCEDURE — 85025 COMPLETE CBC W/AUTO DIFF WBC: CPT

## 2018-03-20 PROCEDURE — 36415 COLL VENOUS BLD VENIPUNCTURE: CPT

## 2018-05-31 ENCOUNTER — HOSPITAL ENCOUNTER (OUTPATIENT)
Age: 68
Setting detail: SPECIMEN
Discharge: HOME | End: 2018-05-31
Payer: MEDICARE

## 2018-05-31 LAB
ABSOLUTE EOS #: 0.15 K/UL (ref 0–0.44)
ABSOLUTE IMMATURE GRANULOCYTE: <0.03 K/UL (ref 0–0.3)
ABSOLUTE LYMPH #: 1.22 K/UL (ref 1.1–3.7)
ABSOLUTE MONO #: 0.46 K/UL (ref 0.1–1.2)
ALT SERPL-CCNC: 13 U/L (ref 5–33)
BASOPHILS # BLD: 0 % (ref 0–2)
BASOPHILS ABSOLUTE: <0.03 K/UL (ref 0–0.2)
DIFFERENTIAL TYPE: ABNORMAL
EOSINOPHILS RELATIVE PERCENT: 3 % (ref 1–4)
HCT VFR BLD CALC: 36.7 % (ref 36.3–47.1)
HEMOGLOBIN: 12 G/DL (ref 11.9–15.1)
IMMATURE GRANULOCYTES: 0 %
LYMPHOCYTES # BLD: 25 % (ref 24–43)
MCH RBC QN AUTO: 32.5 PG (ref 25.2–33.5)
MCHC RBC AUTO-ENTMCNC: 32.7 G/DL (ref 28.4–34.8)
MCV RBC AUTO: 99.5 FL (ref 82.6–102.9)
MONOCYTES # BLD: 9 % (ref 3–12)
NRBC AUTOMATED: 0 PER 100 WBC
PDW BLD-RTO: 12.6 % (ref 11.8–14.4)
PLATELET # BLD: 263 K/UL (ref 138–453)
PLATELET ESTIMATE: ABNORMAL
PMV BLD AUTO: 10.8 FL (ref 8.1–13.5)
RBC # BLD: 3.69 M/UL (ref 3.95–5.11)
RBC # BLD: ABNORMAL 10*6/UL
SEG NEUTROPHILS: 63 % (ref 36–65)
SEGMENTED NEUTROPHILS ABSOLUTE COUNT: 3.02 K/UL (ref 1.5–8.1)
SODIUM BLD-SCNC: 146 MMOL/L (ref 135–144)
TRIGL SERPL-MCNC: 127 MG/DL
WBC # BLD: 4.9 K/UL (ref 3.5–11.3)
WBC # BLD: ABNORMAL 10*3/UL

## 2018-05-31 PROCEDURE — 84478 ASSAY OF TRIGLYCERIDES: CPT

## 2018-05-31 PROCEDURE — 84295 ASSAY OF SERUM SODIUM: CPT

## 2018-05-31 PROCEDURE — 36415 COLL VENOUS BLD VENIPUNCTURE: CPT

## 2018-05-31 PROCEDURE — 85025 COMPLETE CBC W/AUTO DIFF WBC: CPT

## 2018-05-31 PROCEDURE — P9604 ONE-WAY ALLOW PRORATED TRIP: HCPCS

## 2018-05-31 PROCEDURE — 84460 ALANINE AMINO (ALT) (SGPT): CPT

## 2018-06-22 ENCOUNTER — HOSPITAL ENCOUNTER (OUTPATIENT)
Age: 68
Setting detail: SPECIMEN
Discharge: HOME | End: 2018-06-22
Payer: MEDICARE

## 2018-06-22 LAB — TSH SERPL DL<=0.05 MIU/L-ACNC: 0.14 MIU/L (ref 0.3–5)

## 2018-06-22 PROCEDURE — 36415 COLL VENOUS BLD VENIPUNCTURE: CPT

## 2018-06-22 PROCEDURE — 84443 ASSAY THYROID STIM HORMONE: CPT

## 2018-08-30 ENCOUNTER — HOSPITAL ENCOUNTER (OUTPATIENT)
Age: 68
Setting detail: SPECIMEN
Discharge: HOME | End: 2018-08-30
Payer: MEDICARE

## 2018-08-30 LAB
ABSOLUTE EOS #: 0.11 K/UL (ref 0–0.44)
ABSOLUTE IMMATURE GRANULOCYTE: <0.03 K/UL (ref 0–0.3)
ABSOLUTE LYMPH #: 1.49 K/UL (ref 1.1–3.7)
ABSOLUTE MONO #: 0.45 K/UL (ref 0.1–1.2)
ALBUMIN SERPL-MCNC: 3.8 G/DL (ref 3.5–5.2)
ALBUMIN/GLOBULIN RATIO: 1.1 (ref 1–2.5)
ALP BLD-CCNC: 89 U/L (ref 35–104)
ALT SERPL-CCNC: 16 U/L (ref 5–33)
ANION GAP SERPL CALCULATED.3IONS-SCNC: 11 MMOL/L (ref 9–17)
AST SERPL-CCNC: 14 U/L
BASOPHILS # BLD: 0 % (ref 0–2)
BASOPHILS ABSOLUTE: <0.03 K/UL (ref 0–0.2)
BILIRUB SERPL-MCNC: 0.36 MG/DL (ref 0.3–1.2)
BUN BLDV-MCNC: 25 MG/DL (ref 8–23)
BUN/CREAT BLD: 24 (ref 9–20)
CALCIUM SERPL-MCNC: 9.9 MG/DL (ref 8.6–10.4)
CHLORIDE BLD-SCNC: 100 MMOL/L (ref 98–107)
CO2: 29 MMOL/L (ref 20–31)
CREAT SERPL-MCNC: 1.04 MG/DL (ref 0.5–0.9)
DIFFERENTIAL TYPE: NORMAL
EOSINOPHILS RELATIVE PERCENT: 2 % (ref 1–4)
ESTIMATED AVERAGE GLUCOSE: 100 MG/DL
GFR AFRICAN AMERICAN: >60 ML/MIN
GFR NON-AFRICAN AMERICAN: 52 ML/MIN
GFR SERPL CREATININE-BSD FRML MDRD: ABNORMAL ML/MIN/{1.73_M2}
GFR SERPL CREATININE-BSD FRML MDRD: ABNORMAL ML/MIN/{1.73_M2}
GLUCOSE BLD-MCNC: 86 MG/DL (ref 70–99)
HBA1C MFR BLD: 5.1 % (ref 4.8–5.9)
HCT VFR BLD CALC: 40.3 % (ref 36.3–47.1)
HEMOGLOBIN: 13.1 G/DL (ref 11.9–15.1)
IMMATURE GRANULOCYTES: 0 %
LYMPHOCYTES # BLD: 31 % (ref 24–43)
MCH RBC QN AUTO: 31.3 PG (ref 25.2–33.5)
MCHC RBC AUTO-ENTMCNC: 32.5 G/DL (ref 28.4–34.8)
MCV RBC AUTO: 96.4 FL (ref 82.6–102.9)
MONOCYTES # BLD: 9 % (ref 3–12)
NRBC AUTOMATED: 0 PER 100 WBC
PDW BLD-RTO: 13.2 % (ref 11.8–14.4)
PLATELET # BLD: 250 K/UL (ref 138–453)
PLATELET ESTIMATE: NORMAL
PMV BLD AUTO: 10.8 FL (ref 8.1–13.5)
POTASSIUM SERPL-SCNC: 4.4 MMOL/L (ref 3.7–5.3)
RBC # BLD: 4.18 M/UL (ref 3.95–5.11)
RBC # BLD: NORMAL 10*6/UL
SEG NEUTROPHILS: 58 % (ref 36–65)
SEGMENTED NEUTROPHILS ABSOLUTE COUNT: 2.76 K/UL (ref 1.5–8.1)
SODIUM BLD-SCNC: 140 MMOL/L (ref 135–144)
THYROXINE, FREE: 1.77 NG/DL (ref 0.93–1.7)
TOTAL PROTEIN: 7.3 G/DL (ref 6.4–8.3)
TSH SERPL DL<=0.05 MIU/L-ACNC: 0.33 MIU/L (ref 0.3–5)
WBC # BLD: 4.9 K/UL (ref 3.5–11.3)
WBC # BLD: NORMAL 10*3/UL

## 2018-08-30 PROCEDURE — 84443 ASSAY THYROID STIM HORMONE: CPT

## 2018-08-30 PROCEDURE — 80053 COMPREHEN METABOLIC PANEL: CPT

## 2018-08-30 PROCEDURE — 36415 COLL VENOUS BLD VENIPUNCTURE: CPT

## 2018-08-30 PROCEDURE — 83036 HEMOGLOBIN GLYCOSYLATED A1C: CPT

## 2018-08-30 PROCEDURE — 85025 COMPLETE CBC W/AUTO DIFF WBC: CPT

## 2018-08-30 PROCEDURE — 84439 ASSAY OF FREE THYROXINE: CPT

## 2018-08-30 PROCEDURE — P9603 ONE-WAY ALLOW PRORATED MILES: HCPCS

## 2018-09-07 ENCOUNTER — HOSPITAL ENCOUNTER (OUTPATIENT)
Age: 68
Setting detail: SPECIMEN
Discharge: HOME | End: 2018-09-07
Payer: MEDICARE

## 2018-09-07 LAB — TSH SERPL DL<=0.05 MIU/L-ACNC: 0.24 MIU/L (ref 0.3–5)

## 2018-09-07 PROCEDURE — P9604 ONE-WAY ALLOW PRORATED TRIP: HCPCS

## 2018-09-07 PROCEDURE — 84443 ASSAY THYROID STIM HORMONE: CPT

## 2018-09-07 PROCEDURE — 36415 COLL VENOUS BLD VENIPUNCTURE: CPT

## 2018-09-26 ENCOUNTER — HOSPITAL ENCOUNTER (OUTPATIENT)
Age: 68
Setting detail: SPECIMEN
Discharge: HOME | End: 2018-09-26
Payer: MEDICARE

## 2018-09-27 ENCOUNTER — HOSPITAL ENCOUNTER (OUTPATIENT)
Age: 68
Setting detail: SPECIMEN
Discharge: HOME | End: 2018-09-27
Payer: MEDICARE

## 2018-09-27 LAB — TSH SERPL DL<=0.05 MIU/L-ACNC: 0.54 MIU/L (ref 0.3–5)

## 2018-09-27 PROCEDURE — 84443 ASSAY THYROID STIM HORMONE: CPT

## 2018-09-27 PROCEDURE — P9604 ONE-WAY ALLOW PRORATED TRIP: HCPCS

## 2018-09-27 PROCEDURE — 36415 COLL VENOUS BLD VENIPUNCTURE: CPT

## 2018-11-05 ENCOUNTER — HOSPITAL ENCOUNTER (OUTPATIENT)
Age: 68
Setting detail: SPECIMEN
Discharge: HOME | End: 2018-11-05
Payer: MEDICARE

## 2018-11-05 LAB
ABSOLUTE EOS #: 0.08 K/UL (ref 0–0.44)
ABSOLUTE IMMATURE GRANULOCYTE: <0.03 K/UL (ref 0–0.3)
ABSOLUTE LYMPH #: 1.33 K/UL (ref 1.1–3.7)
ABSOLUTE MONO #: 0.63 K/UL (ref 0.1–1.2)
ALT SERPL-CCNC: 16 U/L (ref 5–33)
BASOPHILS # BLD: 0 % (ref 0–2)
BASOPHILS ABSOLUTE: <0.03 K/UL (ref 0–0.2)
DIFFERENTIAL TYPE: ABNORMAL
EOSINOPHILS RELATIVE PERCENT: 2 % (ref 1–4)
HCT VFR BLD CALC: 40.8 % (ref 36.3–47.1)
HEMOGLOBIN: 13.3 G/DL (ref 11.9–15.1)
IMMATURE GRANULOCYTES: 0 %
LYMPHOCYTES # BLD: 29 % (ref 24–43)
MCH RBC QN AUTO: 31.9 PG (ref 25.2–33.5)
MCHC RBC AUTO-ENTMCNC: 32.6 G/DL (ref 28.4–34.8)
MCV RBC AUTO: 97.8 FL (ref 82.6–102.9)
MONOCYTES # BLD: 14 % (ref 3–12)
NRBC AUTOMATED: 0 PER 100 WBC
PDW BLD-RTO: 13.3 % (ref 11.8–14.4)
PLATELET # BLD: 233 K/UL (ref 138–453)
PLATELET ESTIMATE: ABNORMAL
PMV BLD AUTO: 10.7 FL (ref 8.1–13.5)
RBC # BLD: 4.17 M/UL (ref 3.95–5.11)
RBC # BLD: ABNORMAL 10*6/UL
SEG NEUTROPHILS: 55 % (ref 36–65)
SEGMENTED NEUTROPHILS ABSOLUTE COUNT: 2.58 K/UL (ref 1.5–8.1)
SODIUM BLD-SCNC: 139 MMOL/L (ref 135–144)
TSH SERPL DL<=0.05 MIU/L-ACNC: 1.45 MIU/L (ref 0.3–5)
WBC # BLD: 4.7 K/UL (ref 3.5–11.3)
WBC # BLD: ABNORMAL 10*3/UL

## 2018-11-05 PROCEDURE — 84460 ALANINE AMINO (ALT) (SGPT): CPT

## 2018-11-05 PROCEDURE — 36415 COLL VENOUS BLD VENIPUNCTURE: CPT

## 2018-11-05 PROCEDURE — 84443 ASSAY THYROID STIM HORMONE: CPT

## 2018-11-05 PROCEDURE — P9604 ONE-WAY ALLOW PRORATED TRIP: HCPCS

## 2018-11-05 PROCEDURE — 84295 ASSAY OF SERUM SODIUM: CPT

## 2018-11-05 PROCEDURE — 85025 COMPLETE CBC W/AUTO DIFF WBC: CPT

## 2018-11-14 ENCOUNTER — HOSPITAL ENCOUNTER (OUTPATIENT)
Age: 68
Setting detail: SPECIMEN
Discharge: HOME | End: 2018-11-14
Payer: MEDICARE

## 2018-11-14 LAB
ANION GAP SERPL CALCULATED.3IONS-SCNC: 12 MMOL/L (ref 9–17)
BUN BLDV-MCNC: 30 MG/DL (ref 8–23)
BUN/CREAT BLD: 27 (ref 9–20)
CALCIUM SERPL-MCNC: 9.8 MG/DL (ref 8.6–10.4)
CHLORIDE BLD-SCNC: 102 MMOL/L (ref 98–107)
CO2: 30 MMOL/L (ref 20–31)
CREAT SERPL-MCNC: 1.13 MG/DL (ref 0.5–0.9)
GFR AFRICAN AMERICAN: 58 ML/MIN
GFR NON-AFRICAN AMERICAN: 48 ML/MIN
GFR SERPL CREATININE-BSD FRML MDRD: ABNORMAL ML/MIN/{1.73_M2}
GFR SERPL CREATININE-BSD FRML MDRD: ABNORMAL ML/MIN/{1.73_M2}
GLUCOSE BLD-MCNC: 87 MG/DL (ref 70–99)
POTASSIUM SERPL-SCNC: 4.6 MMOL/L (ref 3.7–5.3)
SODIUM BLD-SCNC: 144 MMOL/L (ref 135–144)

## 2018-11-14 PROCEDURE — 36415 COLL VENOUS BLD VENIPUNCTURE: CPT

## 2018-11-14 PROCEDURE — 80048 BASIC METABOLIC PNL TOTAL CA: CPT

## 2018-11-14 PROCEDURE — P9604 ONE-WAY ALLOW PRORATED TRIP: HCPCS

## 2019-02-11 ENCOUNTER — HOSPITAL ENCOUNTER (OUTPATIENT)
Age: 69
Setting detail: SPECIMEN
Discharge: HOME | End: 2019-02-11
Payer: MEDICARE

## 2019-02-11 LAB
ABSOLUTE EOS #: 0.07 K/UL (ref 0–0.44)
ABSOLUTE IMMATURE GRANULOCYTE: 0.06 K/UL (ref 0–0.3)
ABSOLUTE LYMPH #: 1.87 K/UL (ref 1.1–3.7)
ABSOLUTE MONO #: 0.71 K/UL (ref 0.1–1.2)
ALBUMIN SERPL-MCNC: 3.8 G/DL (ref 3.5–5.2)
ALBUMIN/GLOBULIN RATIO: 1.1 (ref 1–2.5)
ALP BLD-CCNC: 95 U/L (ref 35–104)
ALT SERPL-CCNC: 39 U/L (ref 5–33)
ANION GAP SERPL CALCULATED.3IONS-SCNC: 8 MMOL/L (ref 9–17)
AST SERPL-CCNC: 27 U/L
BASOPHILS # BLD: 0 % (ref 0–2)
BASOPHILS ABSOLUTE: <0.03 K/UL (ref 0–0.2)
BILIRUB SERPL-MCNC: 0.19 MG/DL (ref 0.3–1.2)
BUN BLDV-MCNC: 26 MG/DL (ref 8–23)
BUN/CREAT BLD: 23 (ref 9–20)
CALCIUM SERPL-MCNC: 10 MG/DL (ref 8.6–10.4)
CHLORIDE BLD-SCNC: 102 MMOL/L (ref 98–107)
CHOLESTEROL/HDL RATIO: 2.7
CHOLESTEROL: 101 MG/DL
CO2: 31 MMOL/L (ref 20–31)
CREAT SERPL-MCNC: 1.12 MG/DL (ref 0.5–0.9)
DIFFERENTIAL TYPE: ABNORMAL
EOSINOPHILS RELATIVE PERCENT: 1 % (ref 1–4)
ESTIMATED AVERAGE GLUCOSE: 117 MG/DL
GFR AFRICAN AMERICAN: 58 ML/MIN
GFR NON-AFRICAN AMERICAN: 48 ML/MIN
GFR SERPL CREATININE-BSD FRML MDRD: ABNORMAL ML/MIN/{1.73_M2}
GFR SERPL CREATININE-BSD FRML MDRD: ABNORMAL ML/MIN/{1.73_M2}
GLUCOSE BLD-MCNC: 89 MG/DL (ref 70–99)
HBA1C MFR BLD: 5.7 % (ref 4.8–5.9)
HCT VFR BLD CALC: 40.5 % (ref 36.3–47.1)
HDLC SERPL-MCNC: 38 MG/DL
HEMOGLOBIN: 13.2 G/DL (ref 11.9–15.1)
IMMATURE GRANULOCYTES: 1 %
LDL CHOLESTEROL: 43 MG/DL (ref 0–130)
LYMPHOCYTES # BLD: 31 % (ref 24–43)
MCH RBC QN AUTO: 31.2 PG (ref 25.2–33.5)
MCHC RBC AUTO-ENTMCNC: 32.6 G/DL (ref 28.4–34.8)
MCV RBC AUTO: 95.7 FL (ref 82.6–102.9)
MONOCYTES # BLD: 12 % (ref 3–12)
NRBC AUTOMATED: 0 PER 100 WBC
PDW BLD-RTO: 13.1 % (ref 11.8–14.4)
PLATELET # BLD: 245 K/UL (ref 138–453)
PLATELET ESTIMATE: ABNORMAL
PMV BLD AUTO: 10.9 FL (ref 8.1–13.5)
POTASSIUM SERPL-SCNC: 4.3 MMOL/L (ref 3.7–5.3)
RBC # BLD: 4.23 M/UL (ref 3.95–5.11)
RBC # BLD: ABNORMAL 10*6/UL
SEG NEUTROPHILS: 55 % (ref 36–65)
SEGMENTED NEUTROPHILS ABSOLUTE COUNT: 3.37 K/UL (ref 1.5–8.1)
SODIUM BLD-SCNC: 141 MMOL/L (ref 135–144)
THYROXINE, FREE: 1.38 NG/DL (ref 0.93–1.7)
TOTAL PROTEIN: 7.3 G/DL (ref 6.4–8.3)
TRIGL SERPL-MCNC: 98 MG/DL
TSH SERPL DL<=0.05 MIU/L-ACNC: 5.58 MIU/L (ref 0.3–5)
VLDLC SERPL CALC-MCNC: ABNORMAL MG/DL (ref 1–30)
WBC # BLD: 6.1 K/UL (ref 3.5–11.3)
WBC # BLD: ABNORMAL 10*3/UL

## 2019-02-11 PROCEDURE — 80061 LIPID PANEL: CPT

## 2019-02-11 PROCEDURE — 80053 COMPREHEN METABOLIC PANEL: CPT

## 2019-02-11 PROCEDURE — 36415 COLL VENOUS BLD VENIPUNCTURE: CPT

## 2019-02-11 PROCEDURE — 84443 ASSAY THYROID STIM HORMONE: CPT

## 2019-02-11 PROCEDURE — P9604 ONE-WAY ALLOW PRORATED TRIP: HCPCS

## 2019-02-11 PROCEDURE — 85025 COMPLETE CBC W/AUTO DIFF WBC: CPT

## 2019-02-11 PROCEDURE — 83036 HEMOGLOBIN GLYCOSYLATED A1C: CPT

## 2019-02-11 PROCEDURE — 84439 ASSAY OF FREE THYROXINE: CPT

## 2019-05-01 ENCOUNTER — HOSPITAL ENCOUNTER (OUTPATIENT)
Age: 69
Setting detail: SPECIMEN
Discharge: HOME | End: 2019-05-01
Payer: MEDICARE

## 2019-05-01 LAB — TSH SERPL DL<=0.05 MIU/L-ACNC: 5.42 MIU/L (ref 0.3–5)

## 2019-05-01 PROCEDURE — P9603 ONE-WAY ALLOW PRORATED MILES: HCPCS

## 2019-05-01 PROCEDURE — 84443 ASSAY THYROID STIM HORMONE: CPT

## 2019-05-01 PROCEDURE — 36415 COLL VENOUS BLD VENIPUNCTURE: CPT

## 2019-05-16 ENCOUNTER — HOSPITAL ENCOUNTER (OUTPATIENT)
Age: 69
Setting detail: SPECIMEN
Discharge: HOME | End: 2019-05-16
Payer: MEDICARE

## 2019-05-16 LAB
ABSOLUTE EOS #: 0.1 K/UL (ref 0–0.44)
ABSOLUTE IMMATURE GRANULOCYTE: 0.07 K/UL (ref 0–0.3)
ABSOLUTE LYMPH #: 1.55 K/UL (ref 1.1–3.7)
ABSOLUTE MONO #: 0.8 K/UL (ref 0.1–1.2)
ALT SERPL-CCNC: 18 U/L (ref 5–33)
BASOPHILS # BLD: 0 % (ref 0–2)
BASOPHILS ABSOLUTE: <0.03 K/UL (ref 0–0.2)
DIFFERENTIAL TYPE: ABNORMAL
EOSINOPHILS RELATIVE PERCENT: 2 % (ref 1–4)
HCT VFR BLD CALC: 38.7 % (ref 36.3–47.1)
HEMOGLOBIN: 12.5 G/DL (ref 11.9–15.1)
IMMATURE GRANULOCYTES: 1 %
LYMPHOCYTES # BLD: 31 % (ref 24–43)
MCH RBC QN AUTO: 31.3 PG (ref 25.2–33.5)
MCHC RBC AUTO-ENTMCNC: 32.3 G/DL (ref 28.4–34.8)
MCV RBC AUTO: 97 FL (ref 82.6–102.9)
MONOCYTES # BLD: 16 % (ref 3–12)
NRBC AUTOMATED: 0 PER 100 WBC
PDW BLD-RTO: 13.4 % (ref 11.8–14.4)
PLATELET # BLD: 266 K/UL (ref 138–453)
PLATELET ESTIMATE: ABNORMAL
PMV BLD AUTO: 11.3 FL (ref 8.1–13.5)
RBC # BLD: 3.99 M/UL (ref 3.95–5.11)
RBC # BLD: ABNORMAL 10*6/UL
SEG NEUTROPHILS: 50 % (ref 36–65)
SEGMENTED NEUTROPHILS ABSOLUTE COUNT: 2.5 K/UL (ref 1.5–8.1)
SODIUM BLD-SCNC: 141 MMOL/L (ref 135–144)
WBC # BLD: 5 K/UL (ref 3.5–11.3)
WBC # BLD: ABNORMAL 10*3/UL

## 2019-05-16 PROCEDURE — 84460 ALANINE AMINO (ALT) (SGPT): CPT

## 2019-05-16 PROCEDURE — 84295 ASSAY OF SERUM SODIUM: CPT

## 2019-05-16 PROCEDURE — 36415 COLL VENOUS BLD VENIPUNCTURE: CPT

## 2019-05-16 PROCEDURE — 85025 COMPLETE CBC W/AUTO DIFF WBC: CPT

## 2019-05-16 PROCEDURE — P9604 ONE-WAY ALLOW PRORATED TRIP: HCPCS

## 2019-06-26 ENCOUNTER — HOSPITAL ENCOUNTER (OUTPATIENT)
Age: 69
Setting detail: SPECIMEN
Discharge: HOME | End: 2019-06-26
Payer: MEDICARE

## 2019-06-26 LAB
THYROXINE, FREE: 1.62 NG/DL (ref 0.93–1.7)
TSH SERPL DL<=0.05 MIU/L-ACNC: 2.04 MIU/L (ref 0.3–5)

## 2019-06-26 PROCEDURE — 84443 ASSAY THYROID STIM HORMONE: CPT

## 2019-06-26 PROCEDURE — P9604 ONE-WAY ALLOW PRORATED TRIP: HCPCS

## 2019-06-26 PROCEDURE — 84439 ASSAY OF FREE THYROXINE: CPT

## 2019-06-26 PROCEDURE — 36415 COLL VENOUS BLD VENIPUNCTURE: CPT

## 2019-08-12 ENCOUNTER — HOSPITAL ENCOUNTER (OUTPATIENT)
Age: 69
Setting detail: SPECIMEN
Discharge: HOME | End: 2019-08-12
Payer: MEDICARE

## 2019-08-12 LAB
ABSOLUTE EOS #: 0.11 K/UL (ref 0–0.44)
ABSOLUTE IMMATURE GRANULOCYTE: 0.1 K/UL (ref 0–0.3)
ABSOLUTE LYMPH #: 1.68 K/UL (ref 1.1–3.7)
ABSOLUTE MONO #: 0.92 K/UL (ref 0.1–1.2)
ALBUMIN SERPL-MCNC: 3.8 G/DL (ref 3.5–5.2)
ALBUMIN/GLOBULIN RATIO: 1 (ref 1–2.5)
ALP BLD-CCNC: 77 U/L (ref 35–104)
ALT SERPL-CCNC: 18 U/L (ref 5–33)
ANION GAP SERPL CALCULATED.3IONS-SCNC: 12 MMOL/L (ref 9–17)
AST SERPL-CCNC: 16 U/L
BASOPHILS # BLD: 0 % (ref 0–2)
BASOPHILS ABSOLUTE: <0.03 K/UL (ref 0–0.2)
BILIRUB SERPL-MCNC: 0.34 MG/DL (ref 0.3–1.2)
BUN BLDV-MCNC: 21 MG/DL (ref 8–23)
BUN/CREAT BLD: 18 (ref 9–20)
CALCIUM SERPL-MCNC: 9.8 MG/DL (ref 8.6–10.4)
CHLORIDE BLD-SCNC: 101 MMOL/L (ref 98–107)
CO2: 29 MMOL/L (ref 20–31)
CREAT SERPL-MCNC: 1.14 MG/DL (ref 0.5–0.9)
DIFFERENTIAL TYPE: ABNORMAL
EOSINOPHILS RELATIVE PERCENT: 2 % (ref 1–4)
ESTIMATED AVERAGE GLUCOSE: 120 MG/DL
GFR AFRICAN AMERICAN: 57 ML/MIN
GFR NON-AFRICAN AMERICAN: 47 ML/MIN
GFR SERPL CREATININE-BSD FRML MDRD: ABNORMAL ML/MIN/{1.73_M2}
GFR SERPL CREATININE-BSD FRML MDRD: ABNORMAL ML/MIN/{1.73_M2}
GLUCOSE BLD-MCNC: 83 MG/DL (ref 70–99)
HBA1C MFR BLD: 5.8 % (ref 4.8–5.9)
HCT VFR BLD CALC: 38.5 % (ref 36.3–47.1)
HEMOGLOBIN: 12.2 G/DL (ref 11.9–15.1)
IMMATURE GRANULOCYTES: 2 %
LYMPHOCYTES # BLD: 25 % (ref 24–43)
MCH RBC QN AUTO: 31.1 PG (ref 25.2–33.5)
MCHC RBC AUTO-ENTMCNC: 31.7 G/DL (ref 28.4–34.8)
MCV RBC AUTO: 98.2 FL (ref 82.6–102.9)
MONOCYTES # BLD: 14 % (ref 3–12)
NRBC AUTOMATED: 0 PER 100 WBC
PDW BLD-RTO: 13.5 % (ref 11.8–14.4)
PLATELET # BLD: 265 K/UL (ref 138–453)
PLATELET ESTIMATE: ABNORMAL
PMV BLD AUTO: 11.5 FL (ref 8.1–13.5)
POTASSIUM SERPL-SCNC: 4.1 MMOL/L (ref 3.7–5.3)
RBC # BLD: 3.92 M/UL (ref 3.95–5.11)
RBC # BLD: ABNORMAL 10*6/UL
SEG NEUTROPHILS: 57 % (ref 36–65)
SEGMENTED NEUTROPHILS ABSOLUTE COUNT: 3.85 K/UL (ref 1.5–8.1)
SODIUM BLD-SCNC: 142 MMOL/L (ref 135–144)
THYROXINE, FREE: 1.81 NG/DL (ref 0.93–1.7)
TOTAL PROTEIN: 7.5 G/DL (ref 6.4–8.3)
TRIGL SERPL-MCNC: 114 MG/DL
TSH SERPL DL<=0.05 MIU/L-ACNC: 0.88 MIU/L (ref 0.3–5)
WBC # BLD: 6.7 K/UL (ref 3.5–11.3)
WBC # BLD: ABNORMAL 10*3/UL

## 2019-08-12 PROCEDURE — 80053 COMPREHEN METABOLIC PANEL: CPT

## 2019-08-12 PROCEDURE — 85025 COMPLETE CBC W/AUTO DIFF WBC: CPT

## 2019-08-12 PROCEDURE — 84478 ASSAY OF TRIGLYCERIDES: CPT

## 2019-08-12 PROCEDURE — 84443 ASSAY THYROID STIM HORMONE: CPT

## 2019-08-12 PROCEDURE — P9603 ONE-WAY ALLOW PRORATED MILES: HCPCS

## 2019-08-12 PROCEDURE — 83036 HEMOGLOBIN GLYCOSYLATED A1C: CPT

## 2019-08-12 PROCEDURE — 36415 COLL VENOUS BLD VENIPUNCTURE: CPT

## 2019-08-12 PROCEDURE — 84439 ASSAY OF FREE THYROXINE: CPT

## 2019-09-10 PROBLEM — S80.812A: Status: RESOLVED | Noted: 2017-10-12 | Resolved: 2019-09-10

## 2019-09-10 PROBLEM — M83.9 OSTEOMALACIA: Status: RESOLVED | Noted: 2017-04-23 | Resolved: 2019-09-10

## 2019-09-10 PROBLEM — D50.8 IRON DEFICIENCY ANEMIA SECONDARY TO INADEQUATE DIETARY IRON INTAKE: Status: RESOLVED | Noted: 2017-04-23 | Resolved: 2019-09-10

## 2019-09-10 PROBLEM — L03.116 CELLULITIS OF LEFT LOWER EXTREMITY: Status: RESOLVED | Noted: 2017-03-23 | Resolved: 2019-09-10

## 2019-11-27 ENCOUNTER — HOSPITAL ENCOUNTER (OUTPATIENT)
Age: 69
Setting detail: SPECIMEN
Discharge: HOME | End: 2019-11-27
Payer: MEDICARE

## 2019-11-27 LAB
ABSOLUTE EOS #: 0.04 K/UL (ref 0–0.44)
ABSOLUTE IMMATURE GRANULOCYTE: 0.17 K/UL (ref 0–0.3)
ABSOLUTE LYMPH #: 1.5 K/UL (ref 1.1–3.7)
ABSOLUTE MONO #: 1.3 K/UL (ref 0.1–1.2)
ALT SERPL-CCNC: 16 U/L (ref 5–33)
BASOPHILS # BLD: 0 % (ref 0–2)
BASOPHILS ABSOLUTE: <0.03 K/UL (ref 0–0.2)
DIFFERENTIAL TYPE: ABNORMAL
EOSINOPHILS RELATIVE PERCENT: 1 % (ref 1–4)
HCT VFR BLD CALC: 42.1 % (ref 36.3–47.1)
HEMOGLOBIN: 13.6 G/DL (ref 11.9–15.1)
IMMATURE GRANULOCYTES: 3 %
LYMPHOCYTES # BLD: 23 % (ref 24–43)
MCH RBC QN AUTO: 31.5 PG (ref 25.2–33.5)
MCHC RBC AUTO-ENTMCNC: 32.3 G/DL (ref 28.4–34.8)
MCV RBC AUTO: 97.5 FL (ref 82.6–102.9)
MONOCYTES # BLD: 20 % (ref 3–12)
NRBC AUTOMATED: 0 PER 100 WBC
PDW BLD-RTO: 13.2 % (ref 11.8–14.4)
PLATELET # BLD: 205 K/UL (ref 138–453)
PLATELET ESTIMATE: ABNORMAL
PMV BLD AUTO: 11.6 FL (ref 8.1–13.5)
RBC # BLD: 4.32 M/UL (ref 3.95–5.11)
RBC # BLD: ABNORMAL 10*6/UL
SEG NEUTROPHILS: 53 % (ref 36–65)
SEGMENTED NEUTROPHILS ABSOLUTE COUNT: 3.63 K/UL (ref 1.5–8.1)
SODIUM BLD-SCNC: 140 MMOL/L (ref 135–144)
WBC # BLD: 6.7 K/UL (ref 3.5–11.3)
WBC # BLD: ABNORMAL 10*3/UL

## 2019-11-27 PROCEDURE — 85025 COMPLETE CBC W/AUTO DIFF WBC: CPT

## 2019-11-27 PROCEDURE — 36415 COLL VENOUS BLD VENIPUNCTURE: CPT

## 2019-11-27 PROCEDURE — 84295 ASSAY OF SERUM SODIUM: CPT

## 2019-11-27 PROCEDURE — 84460 ALANINE AMINO (ALT) (SGPT): CPT

## 2019-11-27 PROCEDURE — P9603 ONE-WAY ALLOW PRORATED MILES: HCPCS

## 2019-12-27 ENCOUNTER — HOSPITAL ENCOUNTER (OUTPATIENT)
Age: 69
Setting detail: SPECIMEN
Discharge: HOME | End: 2019-12-27
Payer: MEDICARE

## 2019-12-27 LAB
ALBUMIN SERPL-MCNC: 4 G/DL (ref 3.5–5.2)
ALBUMIN/GLOBULIN RATIO: 1.3 (ref 1–2.5)
ALP BLD-CCNC: 80 U/L (ref 35–104)
ALT SERPL-CCNC: 15 U/L (ref 5–33)
ANION GAP SERPL CALCULATED.3IONS-SCNC: 11 MMOL/L (ref 9–17)
AST SERPL-CCNC: 15 U/L
BILIRUB SERPL-MCNC: 0.35 MG/DL (ref 0.3–1.2)
BUN BLDV-MCNC: 20 MG/DL (ref 8–23)
BUN/CREAT BLD: 17 (ref 9–20)
CALCIUM SERPL-MCNC: 9.9 MG/DL (ref 8.6–10.4)
CHLORIDE BLD-SCNC: 102 MMOL/L (ref 98–107)
CO2: 28 MMOL/L (ref 20–31)
CREAT SERPL-MCNC: 1.15 MG/DL (ref 0.5–0.9)
GFR AFRICAN AMERICAN: 56 ML/MIN
GFR NON-AFRICAN AMERICAN: 47 ML/MIN
GFR SERPL CREATININE-BSD FRML MDRD: ABNORMAL ML/MIN/{1.73_M2}
GFR SERPL CREATININE-BSD FRML MDRD: ABNORMAL ML/MIN/{1.73_M2}
GLUCOSE BLD-MCNC: 85 MG/DL (ref 70–99)
POTASSIUM SERPL-SCNC: 4.3 MMOL/L (ref 3.7–5.3)
SODIUM BLD-SCNC: 141 MMOL/L (ref 135–144)
TOTAL PROTEIN: 7.1 G/DL (ref 6.4–8.3)

## 2019-12-27 PROCEDURE — 80053 COMPREHEN METABOLIC PANEL: CPT

## 2019-12-27 PROCEDURE — 36415 COLL VENOUS BLD VENIPUNCTURE: CPT

## 2019-12-27 PROCEDURE — P9604 ONE-WAY ALLOW PRORATED TRIP: HCPCS

## 2020-02-11 ENCOUNTER — HOSPITAL ENCOUNTER (OUTPATIENT)
Age: 70
Setting detail: SPECIMEN
Discharge: HOME | End: 2020-02-11
Payer: MEDICARE

## 2020-02-24 ENCOUNTER — HOSPITAL ENCOUNTER (OUTPATIENT)
Age: 70
Setting detail: SPECIMEN
Discharge: HOME | End: 2020-02-24
Payer: MEDICARE

## 2020-02-24 LAB
ABSOLUTE EOS #: 0.09 K/UL (ref 0–0.44)
ABSOLUTE IMMATURE GRANULOCYTE: 0.05 K/UL (ref 0–0.3)
ABSOLUTE LYMPH #: 0.95 K/UL (ref 1.1–3.7)
ABSOLUTE MONO #: 0.63 K/UL (ref 0.1–1.2)
ALT SERPL-CCNC: 12 U/L (ref 5–33)
ATYPICAL LYMPHOCYTE ABSOLUTE COUNT: 0.05 K/UL
ATYPICAL LYMPHOCYTES: 1 %
BASOPHILS # BLD: 1 % (ref 0–2)
BASOPHILS ABSOLUTE: 0.05 K/UL (ref 0–0.2)
CHOLESTEROL/HDL RATIO: 2.2
CHOLESTEROL: 110 MG/DL
DIFFERENTIAL TYPE: ABNORMAL
EOSINOPHILS RELATIVE PERCENT: 2 % (ref 1–4)
ESTIMATED AVERAGE GLUCOSE: 105 MG/DL
HBA1C MFR BLD: 5.3 % (ref 4.8–5.9)
HCT VFR BLD CALC: 39.5 % (ref 36.3–47.1)
HDLC SERPL-MCNC: 50 MG/DL
HEMOGLOBIN: 12.7 G/DL (ref 11.9–15.1)
IMMATURE GRANULOCYTES: 1 %
LDL CHOLESTEROL: 39 MG/DL (ref 0–130)
LYMPHOCYTES # BLD: 21 % (ref 24–43)
MCH RBC QN AUTO: 31.4 PG (ref 25.2–33.5)
MCHC RBC AUTO-ENTMCNC: 32.2 G/DL (ref 28.4–34.8)
MCV RBC AUTO: 97.5 FL (ref 82.6–102.9)
MONOCYTES # BLD: 14 % (ref 3–12)
MORPHOLOGY: NORMAL
NRBC AUTOMATED: 0 PER 100 WBC
PDW BLD-RTO: 13.1 % (ref 11.8–14.4)
PLATELET # BLD: 215 K/UL (ref 138–453)
PLATELET ESTIMATE: ABNORMAL
PMV BLD AUTO: 11.4 FL (ref 8.1–13.5)
RBC # BLD: 4.05 M/UL (ref 3.95–5.11)
RBC # BLD: ABNORMAL 10*6/UL
SEG NEUTROPHILS: 60 % (ref 36–65)
SEGMENTED NEUTROPHILS ABSOLUTE COUNT: 2.68 K/UL (ref 1.5–8.1)
SODIUM BLD-SCNC: 138 MMOL/L (ref 135–144)
THYROXINE, FREE: 1.9 NG/DL (ref 0.93–1.7)
TRIGL SERPL-MCNC: 104 MG/DL
TSH SERPL DL<=0.05 MIU/L-ACNC: 0.76 MIU/L (ref 0.3–5)
VLDLC SERPL CALC-MCNC: NORMAL MG/DL (ref 1–30)
WBC # BLD: 4.5 K/UL (ref 3.5–11.3)
WBC # BLD: ABNORMAL 10*3/UL

## 2020-02-24 PROCEDURE — 84295 ASSAY OF SERUM SODIUM: CPT

## 2020-02-24 PROCEDURE — P9603 ONE-WAY ALLOW PRORATED MILES: HCPCS

## 2020-02-24 PROCEDURE — 36415 COLL VENOUS BLD VENIPUNCTURE: CPT

## 2020-02-24 PROCEDURE — 84460 ALANINE AMINO (ALT) (SGPT): CPT

## 2020-02-24 PROCEDURE — 83036 HEMOGLOBIN GLYCOSYLATED A1C: CPT

## 2020-02-24 PROCEDURE — 84443 ASSAY THYROID STIM HORMONE: CPT

## 2020-02-24 PROCEDURE — 84439 ASSAY OF FREE THYROXINE: CPT

## 2020-02-24 PROCEDURE — 85025 COMPLETE CBC W/AUTO DIFF WBC: CPT

## 2020-02-24 PROCEDURE — 80061 LIPID PANEL: CPT

## 2020-03-12 ENCOUNTER — HOSPITAL ENCOUNTER (OUTPATIENT)
Age: 70
Setting detail: SPECIMEN
Discharge: HOME | End: 2020-03-12
Payer: MEDICARE

## 2020-03-19 ENCOUNTER — HOSPITAL ENCOUNTER (OUTPATIENT)
Age: 70
Setting detail: SPECIMEN
Discharge: HOME | End: 2020-03-19
Payer: MEDICARE

## 2020-03-19 LAB — TSH SERPL DL<=0.05 MIU/L-ACNC: 0.67 MIU/L (ref 0.3–5)

## 2020-03-19 PROCEDURE — P9603 ONE-WAY ALLOW PRORATED MILES: HCPCS

## 2020-03-19 PROCEDURE — 36415 COLL VENOUS BLD VENIPUNCTURE: CPT

## 2020-03-19 PROCEDURE — 84443 ASSAY THYROID STIM HORMONE: CPT

## 2020-05-27 ENCOUNTER — HOSPITAL ENCOUNTER (OUTPATIENT)
Age: 70
Setting detail: SPECIMEN
Discharge: HOME | End: 2020-05-27
Payer: MEDICARE

## 2020-05-27 LAB
ABSOLUTE EOS #: 0.07 K/UL (ref 0–0.44)
ABSOLUTE IMMATURE GRANULOCYTE: <0.03 K/UL (ref 0–0.3)
ABSOLUTE LYMPH #: 0.98 K/UL (ref 1.1–3.7)
ABSOLUTE MONO #: 0.63 K/UL (ref 0.1–1.2)
ALBUMIN SERPL-MCNC: 4.2 G/DL (ref 3.5–5.2)
ALBUMIN/GLOBULIN RATIO: 1.4 (ref 1–2.5)
ALP BLD-CCNC: 76 U/L (ref 35–104)
ALT SERPL-CCNC: 12 U/L (ref 5–33)
ANION GAP SERPL CALCULATED.3IONS-SCNC: 13 MMOL/L (ref 9–17)
AST SERPL-CCNC: 12 U/L
BASOPHILS # BLD: 0 % (ref 0–2)
BASOPHILS ABSOLUTE: <0.03 K/UL (ref 0–0.2)
BILIRUB SERPL-MCNC: 0.39 MG/DL (ref 0.3–1.2)
BUN BLDV-MCNC: 23 MG/DL (ref 8–23)
BUN/CREAT BLD: 18 (ref 9–20)
CALCIUM SERPL-MCNC: 9.9 MG/DL (ref 8.6–10.4)
CHLORIDE BLD-SCNC: 99 MMOL/L (ref 98–107)
CO2: 28 MMOL/L (ref 20–31)
CREAT SERPL-MCNC: 1.3 MG/DL (ref 0.5–0.9)
DIFFERENTIAL TYPE: ABNORMAL
EOSINOPHILS RELATIVE PERCENT: 2 % (ref 1–4)
GFR AFRICAN AMERICAN: 49 ML/MIN
GFR NON-AFRICAN AMERICAN: 40 ML/MIN
GFR SERPL CREATININE-BSD FRML MDRD: ABNORMAL ML/MIN/{1.73_M2}
GFR SERPL CREATININE-BSD FRML MDRD: ABNORMAL ML/MIN/{1.73_M2}
GLUCOSE BLD-MCNC: 91 MG/DL (ref 70–99)
HCT VFR BLD CALC: 40.3 % (ref 36.3–47.1)
HEMOGLOBIN: 13 G/DL (ref 11.9–15.1)
IMMATURE GRANULOCYTES: 1 %
LYMPHOCYTES # BLD: 26 % (ref 24–43)
MCH RBC QN AUTO: 30.8 PG (ref 25.2–33.5)
MCHC RBC AUTO-ENTMCNC: 32.3 G/DL (ref 28.4–34.8)
MCV RBC AUTO: 95.5 FL (ref 82.6–102.9)
MONOCYTES # BLD: 17 % (ref 3–12)
NRBC AUTOMATED: 0 PER 100 WBC
PDW BLD-RTO: 12.8 % (ref 11.8–14.4)
PLATELET # BLD: 186 K/UL (ref 138–453)
PLATELET ESTIMATE: ABNORMAL
PMV BLD AUTO: 11.6 FL (ref 8.1–13.5)
POTASSIUM SERPL-SCNC: 4.1 MMOL/L (ref 3.7–5.3)
RBC # BLD: 4.22 M/UL (ref 3.95–5.11)
RBC # BLD: ABNORMAL 10*6/UL
SEG NEUTROPHILS: 54 % (ref 36–65)
SEGMENTED NEUTROPHILS ABSOLUTE COUNT: 2.1 K/UL (ref 1.5–8.1)
SODIUM BLD-SCNC: 140 MMOL/L (ref 135–144)
TOTAL PROTEIN: 7.1 G/DL (ref 6.4–8.3)
WBC # BLD: 3.8 K/UL (ref 3.5–11.3)
WBC # BLD: ABNORMAL 10*3/UL

## 2020-05-27 PROCEDURE — 85025 COMPLETE CBC W/AUTO DIFF WBC: CPT

## 2020-05-27 PROCEDURE — 80053 COMPREHEN METABOLIC PANEL: CPT

## 2020-08-24 ENCOUNTER — HOSPITAL ENCOUNTER (OUTPATIENT)
Age: 70
Setting detail: SPECIMEN
Discharge: HOME | End: 2020-08-24
Payer: MEDICARE

## 2020-08-24 LAB
ABSOLUTE EOS #: 0.08 K/UL (ref 0–0.44)
ABSOLUTE IMMATURE GRANULOCYTE: 0.06 K/UL (ref 0–0.3)
ABSOLUTE LYMPH #: 1.16 K/UL (ref 1.1–3.7)
ABSOLUTE MONO #: 0.72 K/UL (ref 0.1–1.2)
ALBUMIN SERPL-MCNC: 4.2 G/DL (ref 3.5–5.2)
ALBUMIN/GLOBULIN RATIO: 1.3 (ref 1–2.5)
ALP BLD-CCNC: 70 U/L (ref 35–104)
ALT SERPL-CCNC: 8 U/L (ref 5–33)
ANION GAP SERPL CALCULATED.3IONS-SCNC: 12 MMOL/L (ref 9–17)
AST SERPL-CCNC: 12 U/L
BASOPHILS # BLD: 0 % (ref 0–2)
BASOPHILS ABSOLUTE: <0.03 K/UL (ref 0–0.2)
BILIRUB SERPL-MCNC: 0.48 MG/DL (ref 0.3–1.2)
BUN BLDV-MCNC: 33 MG/DL (ref 8–23)
BUN/CREAT BLD: 20 (ref 9–20)
CALCIUM SERPL-MCNC: 10.7 MG/DL (ref 8.6–10.4)
CHLORIDE BLD-SCNC: 99 MMOL/L (ref 98–107)
CO2: 25 MMOL/L (ref 20–31)
CREAT SERPL-MCNC: 1.66 MG/DL (ref 0.5–0.9)
DIFFERENTIAL TYPE: ABNORMAL
EOSINOPHILS RELATIVE PERCENT: 2 % (ref 1–4)
ESTIMATED AVERAGE GLUCOSE: 114 MG/DL
GFR AFRICAN AMERICAN: 37 ML/MIN
GFR NON-AFRICAN AMERICAN: 30 ML/MIN
GFR SERPL CREATININE-BSD FRML MDRD: ABNORMAL ML/MIN/{1.73_M2}
GFR SERPL CREATININE-BSD FRML MDRD: ABNORMAL ML/MIN/{1.73_M2}
GLUCOSE BLD-MCNC: 96 MG/DL (ref 70–99)
HBA1C MFR BLD: 5.6 % (ref 4–6)
HCT VFR BLD CALC: 36.4 % (ref 36.3–47.1)
HEMOGLOBIN: 12.1 G/DL (ref 11.9–15.1)
IMMATURE GRANULOCYTES: 1 %
LYMPHOCYTES # BLD: 27 % (ref 24–43)
MCH RBC QN AUTO: 32 PG (ref 25.2–33.5)
MCHC RBC AUTO-ENTMCNC: 33.2 G/DL (ref 28.4–34.8)
MCV RBC AUTO: 96.3 FL (ref 82.6–102.9)
MONOCYTES # BLD: 17 % (ref 3–12)
NRBC AUTOMATED: 0 PER 100 WBC
PDW BLD-RTO: 13.1 % (ref 11.8–14.4)
PLATELET # BLD: 213 K/UL (ref 138–453)
PLATELET ESTIMATE: ABNORMAL
PMV BLD AUTO: 11.9 FL (ref 8.1–13.5)
POTASSIUM SERPL-SCNC: 3.9 MMOL/L (ref 3.7–5.3)
RBC # BLD: 3.78 M/UL (ref 3.95–5.11)
RBC # BLD: ABNORMAL 10*6/UL
SEG NEUTROPHILS: 53 % (ref 36–65)
SEGMENTED NEUTROPHILS ABSOLUTE COUNT: 2.29 K/UL (ref 1.5–8.1)
SODIUM BLD-SCNC: 136 MMOL/L (ref 135–144)
THYROXINE, FREE: 2.28 NG/DL (ref 0.93–1.7)
TOTAL PROTEIN: 7.5 G/DL (ref 6.4–8.3)
TRIGL SERPL-MCNC: 121 MG/DL
TSH SERPL DL<=0.05 MIU/L-ACNC: 0.15 MIU/L (ref 0.3–5)
WBC # BLD: 4.3 K/UL (ref 3.5–11.3)
WBC # BLD: ABNORMAL 10*3/UL

## 2020-08-24 PROCEDURE — 36415 COLL VENOUS BLD VENIPUNCTURE: CPT

## 2020-08-24 PROCEDURE — 80053 COMPREHEN METABOLIC PANEL: CPT

## 2020-08-24 PROCEDURE — 84478 ASSAY OF TRIGLYCERIDES: CPT

## 2020-08-24 PROCEDURE — 84439 ASSAY OF FREE THYROXINE: CPT

## 2020-08-24 PROCEDURE — 83036 HEMOGLOBIN GLYCOSYLATED A1C: CPT

## 2020-08-24 PROCEDURE — 85025 COMPLETE CBC W/AUTO DIFF WBC: CPT

## 2020-08-24 PROCEDURE — 84443 ASSAY THYROID STIM HORMONE: CPT

## 2020-08-24 PROCEDURE — P9603 ONE-WAY ALLOW PRORATED MILES: HCPCS

## 2020-10-07 ENCOUNTER — HOSPITAL ENCOUNTER (INPATIENT)
Age: 70
LOS: 2 days | Discharge: NURSING, REHAB OR LONG-TERM CARE | DRG: 682 | End: 2020-10-09
Attending: EMERGENCY MEDICINE | Admitting: INTERNAL MEDICINE
Payer: MEDICARE

## 2020-10-07 ENCOUNTER — HOSPITAL ENCOUNTER (OUTPATIENT)
Age: 70
Setting detail: SPECIMEN
Discharge: HOME | DRG: 682 | End: 2020-10-07
Payer: MEDICARE

## 2020-10-07 ENCOUNTER — APPOINTMENT (OUTPATIENT)
Dept: GENERAL RADIOLOGY | Age: 70
DRG: 682 | End: 2020-10-07
Payer: MEDICARE

## 2020-10-07 DIAGNOSIS — N28.9 ACUTE RENAL INSUFFICIENCY: Primary | ICD-10-CM

## 2020-10-07 PROBLEM — N17.9 ACUTE KIDNEY INJURY (HCC): Status: ACTIVE | Noted: 2020-10-07

## 2020-10-07 LAB
-: ABNORMAL
ABSOLUTE EOS #: 0.1 K/UL (ref 0–0.44)
ABSOLUTE IMMATURE GRANULOCYTE: 0.09 K/UL (ref 0–0.3)
ABSOLUTE LYMPH #: 1.07 K/UL (ref 1.1–3.7)
ABSOLUTE MONO #: 1.05 K/UL (ref 0.1–1.2)
ALBUMIN SERPL-MCNC: 4.3 G/DL (ref 3.5–5.2)
ALBUMIN/GLOBULIN RATIO: 1.3 (ref 1–2.5)
ALP BLD-CCNC: 70 U/L (ref 35–104)
ALT SERPL-CCNC: 14 U/L (ref 5–33)
AMORPHOUS: ABNORMAL
ANION GAP SERPL CALCULATED.3IONS-SCNC: 11 MMOL/L (ref 9–17)
ANION GAP SERPL CALCULATED.3IONS-SCNC: 13 MMOL/L (ref 9–17)
AST SERPL-CCNC: 20 U/L
BACTERIA: ABNORMAL
BASOPHILS # BLD: 0 % (ref 0–2)
BASOPHILS ABSOLUTE: <0.03 K/UL (ref 0–0.2)
BILIRUB SERPL-MCNC: 0.41 MG/DL (ref 0.3–1.2)
BILIRUBIN URINE: NEGATIVE
BUN BLDV-MCNC: 51 MG/DL (ref 8–23)
BUN BLDV-MCNC: 55 MG/DL (ref 8–23)
BUN/CREAT BLD: 16 (ref 9–20)
BUN/CREAT BLD: 19 (ref 9–20)
C-REACTIVE PROTEIN: 12.4 MG/L (ref 0–5)
CALCIUM SERPL-MCNC: 10.7 MG/DL (ref 8.6–10.4)
CALCIUM SERPL-MCNC: 11 MG/DL (ref 8.6–10.4)
CASTS UA: ABNORMAL /LPF
CHLORIDE BLD-SCNC: 101 MMOL/L (ref 98–107)
CHLORIDE BLD-SCNC: 102 MMOL/L (ref 98–107)
CO2: 24 MMOL/L (ref 20–31)
CO2: 26 MMOL/L (ref 20–31)
COLOR: YELLOW
COMMENT UA: ABNORMAL
CREAT SERPL-MCNC: 2.63 MG/DL (ref 0.5–0.9)
CREAT SERPL-MCNC: 3.34 MG/DL (ref 0.5–0.9)
CRYSTALS, UA: ABNORMAL /HPF
DIFFERENTIAL TYPE: ABNORMAL
EOSINOPHILS RELATIVE PERCENT: 2 % (ref 1–4)
EPITHELIAL CELLS UA: ABNORMAL /HPF (ref 0–25)
FOLATE: 9.7 NG/ML
GFR AFRICAN AMERICAN: 16 ML/MIN
GFR AFRICAN AMERICAN: 22 ML/MIN
GFR NON-AFRICAN AMERICAN: 14 ML/MIN
GFR NON-AFRICAN AMERICAN: 18 ML/MIN
GFR SERPL CREATININE-BSD FRML MDRD: ABNORMAL ML/MIN/{1.73_M2}
GLUCOSE BLD-MCNC: 121 MG/DL (ref 70–99)
GLUCOSE BLD-MCNC: 82 MG/DL (ref 70–99)
GLUCOSE URINE: NEGATIVE
HCT VFR BLD CALC: 35.1 % (ref 36.3–47.1)
HEMOGLOBIN: 11.4 G/DL (ref 11.9–15.1)
IMMATURE GRANULOCYTES: 2 %
KETONES, URINE: ABNORMAL
LEUKOCYTE ESTERASE, URINE: ABNORMAL
LYMPHOCYTES # BLD: 20 % (ref 24–43)
MCH RBC QN AUTO: 32.7 PG (ref 25.2–33.5)
MCHC RBC AUTO-ENTMCNC: 32.5 G/DL (ref 28.4–34.8)
MCV RBC AUTO: 100.6 FL (ref 82.6–102.9)
MONOCYTES # BLD: 20 % (ref 3–12)
MUCUS: ABNORMAL
NITRITE, URINE: NEGATIVE
NRBC AUTOMATED: 0 PER 100 WBC
OTHER OBSERVATIONS UA: ABNORMAL
PDW BLD-RTO: 12.8 % (ref 11.8–14.4)
PH UA: 5 (ref 5–9)
PLATELET # BLD: 194 K/UL (ref 138–453)
PLATELET ESTIMATE: ABNORMAL
PMV BLD AUTO: 11.4 FL (ref 8.1–13.5)
POTASSIUM SERPL-SCNC: 4.6 MMOL/L (ref 3.7–5.3)
POTASSIUM SERPL-SCNC: 4.8 MMOL/L (ref 3.7–5.3)
PROTEIN UA: NEGATIVE
RBC # BLD: 3.49 M/UL (ref 3.95–5.11)
RBC # BLD: ABNORMAL 10*6/UL
RBC UA: ABNORMAL /HPF (ref 0–2)
RENAL EPITHELIAL, UA: ABNORMAL /HPF
SEG NEUTROPHILS: 56 % (ref 36–65)
SEGMENTED NEUTROPHILS ABSOLUTE COUNT: 2.95 K/UL (ref 1.5–8.1)
SODIUM BLD-SCNC: 138 MMOL/L (ref 135–144)
SODIUM BLD-SCNC: 139 MMOL/L (ref 135–144)
SPECIFIC GRAVITY UA: >1.03 (ref 1.01–1.02)
TOTAL PROTEIN: 7.5 G/DL (ref 6.4–8.3)
TRICHOMONAS: ABNORMAL
TSH SERPL DL<=0.05 MIU/L-ACNC: 0.24 MIU/L (ref 0.3–5)
TURBIDITY: ABNORMAL
URINE HGB: NEGATIVE
UROBILINOGEN, URINE: NORMAL
VITAMIN B-12: 596 PG/ML (ref 232–1245)
WBC # BLD: 5.3 K/UL (ref 3.5–11.3)
WBC # BLD: ABNORMAL 10*3/UL
WBC UA: ABNORMAL /HPF (ref 0–5)
YEAST: ABNORMAL

## 2020-10-07 PROCEDURE — 99285 EMERGENCY DEPT VISIT HI MDM: CPT

## 2020-10-07 PROCEDURE — 87186 SC STD MICRODIL/AGAR DIL: CPT

## 2020-10-07 PROCEDURE — 87088 URINE BACTERIA CULTURE: CPT

## 2020-10-07 PROCEDURE — 6360000002 HC RX W HCPCS: Performed by: INTERNAL MEDICINE

## 2020-10-07 PROCEDURE — 87086 URINE CULTURE/COLONY COUNT: CPT

## 2020-10-07 PROCEDURE — 99284 EMERGENCY DEPT VISIT MOD MDM: CPT

## 2020-10-07 PROCEDURE — 82746 ASSAY OF FOLIC ACID SERUM: CPT

## 2020-10-07 PROCEDURE — 86140 C-REACTIVE PROTEIN: CPT

## 2020-10-07 PROCEDURE — 1200000000 HC SEMI PRIVATE

## 2020-10-07 PROCEDURE — 82607 VITAMIN B-12: CPT

## 2020-10-07 PROCEDURE — 81001 URINALYSIS AUTO W/SCOPE: CPT

## 2020-10-07 PROCEDURE — 36415 COLL VENOUS BLD VENIPUNCTURE: CPT

## 2020-10-07 PROCEDURE — 2580000003 HC RX 258: Performed by: INTERNAL MEDICINE

## 2020-10-07 PROCEDURE — 85025 COMPLETE CBC W/AUTO DIFF WBC: CPT

## 2020-10-07 PROCEDURE — 71045 X-RAY EXAM CHEST 1 VIEW: CPT

## 2020-10-07 PROCEDURE — 80053 COMPREHEN METABOLIC PANEL: CPT

## 2020-10-07 PROCEDURE — 6370000000 HC RX 637 (ALT 250 FOR IP): Performed by: INTERNAL MEDICINE

## 2020-10-07 PROCEDURE — 80048 BASIC METABOLIC PNL TOTAL CA: CPT

## 2020-10-07 PROCEDURE — 2580000003 HC RX 258: Performed by: EMERGENCY MEDICINE

## 2020-10-07 PROCEDURE — 84443 ASSAY THYROID STIM HORMONE: CPT

## 2020-10-07 RX ORDER — LAMOTRIGINE 100 MG/1
100 TABLET ORAL EVERY EVENING
Status: DISCONTINUED | OUTPATIENT
Start: 2020-10-07 | End: 2020-10-09 | Stop reason: HOSPADM

## 2020-10-07 RX ORDER — VALBENAZINE 40 MG/1
40 CAPSULE ORAL DAILY
COMMUNITY

## 2020-10-07 RX ORDER — ACETAMINOPHEN 325 MG/1
650 TABLET ORAL EVERY 6 HOURS PRN
Status: DISCONTINUED | OUTPATIENT
Start: 2020-10-07 | End: 2020-10-09 | Stop reason: HOSPADM

## 2020-10-07 RX ORDER — UREA 10 %
3 LOTION (ML) TOPICAL EVERY EVENING
Status: DISCONTINUED | OUTPATIENT
Start: 2020-10-07 | End: 2020-10-09 | Stop reason: HOSPADM

## 2020-10-07 RX ORDER — OMEPRAZOLE 20 MG/1
20 CAPSULE, DELAYED RELEASE ORAL DAILY
COMMUNITY

## 2020-10-07 RX ORDER — PANTOPRAZOLE SODIUM 40 MG/1
40 TABLET, DELAYED RELEASE ORAL
Status: DISCONTINUED | OUTPATIENT
Start: 2020-10-08 | End: 2020-10-09 | Stop reason: HOSPADM

## 2020-10-07 RX ORDER — AMLODIPINE BESYLATE 5 MG/1
5 TABLET ORAL DAILY
COMMUNITY

## 2020-10-07 RX ORDER — 0.9 % SODIUM CHLORIDE 0.9 %
1000 INTRAVENOUS SOLUTION INTRAVENOUS ONCE
Status: COMPLETED | OUTPATIENT
Start: 2020-10-07 | End: 2020-10-07

## 2020-10-07 RX ORDER — CARBAMAZEPINE 100 MG/1
100 TABLET, CHEWABLE ORAL DAILY
Status: DISCONTINUED | OUTPATIENT
Start: 2020-10-07 | End: 2020-10-09 | Stop reason: HOSPADM

## 2020-10-07 RX ORDER — CARVEDILOL 25 MG/1
25 TABLET ORAL 2 TIMES DAILY WITH MEALS
Status: DISCONTINUED | OUTPATIENT
Start: 2020-10-07 | End: 2020-10-09 | Stop reason: HOSPADM

## 2020-10-07 RX ORDER — SODIUM CHLORIDE 9 MG/ML
INJECTION, SOLUTION INTRAVENOUS CONTINUOUS
Status: DISCONTINUED | OUTPATIENT
Start: 2020-10-07 | End: 2020-10-09 | Stop reason: HOSPADM

## 2020-10-07 RX ORDER — AMANTADINE HYDROCHLORIDE 100 MG/1
100 TABLET ORAL 2 TIMES DAILY
COMMUNITY

## 2020-10-07 RX ORDER — SODIUM CHLORIDE 0.9 % (FLUSH) 0.9 %
10 SYRINGE (ML) INJECTION PRN
Status: DISCONTINUED | OUTPATIENT
Start: 2020-10-07 | End: 2020-10-09 | Stop reason: HOSPADM

## 2020-10-07 RX ORDER — VENLAFAXINE 75 MG/1
75 TABLET ORAL EVERY MORNING
Status: DISCONTINUED | OUTPATIENT
Start: 2020-10-08 | End: 2020-10-09 | Stop reason: HOSPADM

## 2020-10-07 RX ORDER — AMLODIPINE BESYLATE 5 MG/1
5 TABLET ORAL DAILY
Status: DISCONTINUED | OUTPATIENT
Start: 2020-10-07 | End: 2020-10-08

## 2020-10-07 RX ORDER — TRAMADOL HYDROCHLORIDE 50 MG/1
50 TABLET ORAL 2 TIMES DAILY
Status: DISCONTINUED | OUTPATIENT
Start: 2020-10-07 | End: 2020-10-09 | Stop reason: HOSPADM

## 2020-10-07 RX ORDER — LAMOTRIGINE 25 MG/1
50 TABLET ORAL DAILY
Status: DISCONTINUED | OUTPATIENT
Start: 2020-10-08 | End: 2020-10-09 | Stop reason: HOSPADM

## 2020-10-07 RX ORDER — DULAGLUTIDE 0.75 MG/.5ML
0.75 INJECTION, SOLUTION SUBCUTANEOUS WEEKLY
COMMUNITY

## 2020-10-07 RX ORDER — LAMOTRIGINE 25 MG/1
50 TABLET ORAL DAILY
COMMUNITY

## 2020-10-07 RX ORDER — POLYETHYLENE GLYCOL 3350 17 G/17G
17 POWDER, FOR SOLUTION ORAL DAILY PRN
Status: DISCONTINUED | OUTPATIENT
Start: 2020-10-07 | End: 2020-10-09 | Stop reason: HOSPADM

## 2020-10-07 RX ORDER — ONDANSETRON 2 MG/ML
4 INJECTION INTRAMUSCULAR; INTRAVENOUS EVERY 6 HOURS PRN
Status: DISCONTINUED | OUTPATIENT
Start: 2020-10-07 | End: 2020-10-09 | Stop reason: HOSPADM

## 2020-10-07 RX ORDER — LEVOTHYROXINE SODIUM 0.15 MG/1
150 TABLET ORAL DAILY
Status: DISCONTINUED | OUTPATIENT
Start: 2020-10-07 | End: 2020-10-09 | Stop reason: HOSPADM

## 2020-10-07 RX ORDER — PROMETHAZINE HYDROCHLORIDE 25 MG/1
12.5 TABLET ORAL EVERY 6 HOURS PRN
Status: DISCONTINUED | OUTPATIENT
Start: 2020-10-07 | End: 2020-10-09 | Stop reason: HOSPADM

## 2020-10-07 RX ORDER — ATORVASTATIN CALCIUM 20 MG/1
20 TABLET, FILM COATED ORAL DAILY
Status: DISCONTINUED | OUTPATIENT
Start: 2020-10-07 | End: 2020-10-09 | Stop reason: HOSPADM

## 2020-10-07 RX ORDER — OLANZAPINE 5 MG/1
5 TABLET ORAL 2 TIMES DAILY
Status: DISCONTINUED | OUTPATIENT
Start: 2020-10-07 | End: 2020-10-09 | Stop reason: HOSPADM

## 2020-10-07 RX ORDER — AMANTADINE HYDROCHLORIDE 100 MG/1
100 CAPSULE, GELATIN COATED ORAL 2 TIMES DAILY
Status: DISCONTINUED | OUTPATIENT
Start: 2020-10-07 | End: 2020-10-09 | Stop reason: HOSPADM

## 2020-10-07 RX ORDER — SODIUM CHLORIDE 0.9 % (FLUSH) 0.9 %
10 SYRINGE (ML) INJECTION EVERY 12 HOURS SCHEDULED
Status: DISCONTINUED | OUTPATIENT
Start: 2020-10-07 | End: 2020-10-09 | Stop reason: HOSPADM

## 2020-10-07 RX ORDER — ACETAMINOPHEN 650 MG/1
650 SUPPOSITORY RECTAL EVERY 6 HOURS PRN
Status: DISCONTINUED | OUTPATIENT
Start: 2020-10-07 | End: 2020-10-09 | Stop reason: HOSPADM

## 2020-10-07 RX ADMIN — Medication 3 MG: at 20:21

## 2020-10-07 RX ADMIN — TRAMADOL HYDROCHLORIDE 50 MG: 50 TABLET, FILM COATED ORAL at 20:22

## 2020-10-07 RX ADMIN — SODIUM CHLORIDE: 9 INJECTION, SOLUTION INTRAVENOUS at 16:32

## 2020-10-07 RX ADMIN — ATORVASTATIN CALCIUM 20 MG: 20 TABLET, FILM COATED ORAL at 20:27

## 2020-10-07 RX ADMIN — OLANZAPINE 5 MG: 5 TABLET, FILM COATED ORAL at 20:22

## 2020-10-07 RX ADMIN — SODIUM CHLORIDE 1000 ML: 9 INJECTION, SOLUTION INTRAVENOUS at 15:04

## 2020-10-07 RX ADMIN — AMANTADINE HYDROCHLORIDE 100 MG: 100 CAPSULE ORAL at 20:22

## 2020-10-07 RX ADMIN — LAMOTRIGINE 100 MG: 100 TABLET ORAL at 20:25

## 2020-10-07 RX ADMIN — ENOXAPARIN SODIUM 40 MG: 40 INJECTION, SOLUTION INTRAVENOUS; SUBCUTANEOUS at 17:43

## 2020-10-07 NOTE — ED NOTES
Dr. Zechariah Martinez returned call. Currently speaking with Dr. Prem Del Angel.      Amita DIAZ Reser  10/07/20 9389

## 2020-10-07 NOTE — ED NOTES
Bed: 01B  Expected date:   Expected time:   Means of arrival:   Comments:  EMS     Hilda Boggs RN  10/07/20 [id number]

## 2020-10-07 NOTE — ED PROVIDER NOTES
[facility] ED  [address] COMPLAINT   Chief Complaint   Patient presents with    Other     sent from [facility] per PCP for general decline and abnormal labs    Tremors    Extremity Weakness      HPI   Gisselle Meyer is a 65 y.o. female who presents with non focal weakness since the onset yesterday. The duration has been constant since the onset. The generalized weakness is associated. There is no pain. No aggravating or alleviating factors. They have had this previously. The are a nursing home patient with some intellectual disability. REVIEW OF SYSTEMS   Cardiac: No chest pain or palpitations  Respiratory: No shortness of breath or new cough  General: No fevers   : No dysuria or hematuria  GI: No vomiting or diarrhea  See HPI for further details. All other systems reviewed and are negative. Wava Prim      PAST MEDICAL OR SURGICAL HISTORY   Past Medical History:   Diagnosis Date    Bipolar disorder ([facility] [address]     Cataracts, bilateral     Dementia ([facility] [address]     Depression     Enthesopathy     Hyperlipidemia     Hypertension     Hypothyroidism     MRSA (methicillin resistant staph aureus) culture positive 04/22/2017    nares    Obesity     Osteoarthritis     Person living in residential institution     Type 2 diabetes mellitus ([facility] [address]      Past Surgical History:   Procedure Laterality Date    HYSTERECTOMY         CURRENT MEDICATIONS   Current Outpatient Rx   Medication Sig Dispense Refill    Valbenazine Tosylate (INGREZZA) 40 MG CAPS Take 40 mg by mouth daily      lamoTRIgine (LAMICTAL) 25 MG tablet Take 50 mg by mouth daily In the morning      amLODIPine (NORVASC) 5 MG tablet Take 5 mg by mouth daily      omeprazole (PRILOSEC) 20 MG delayed release capsule Take 20 mg by mouth daily      Dulaglutide (TRULICITY) 6.38 IS/0.7HA SOPN Inject 0.75 mg into the skin once a week Every monday      Amantadine (SYMMETREL) 100 MG TABS tablet Take 100 mg by mouth 2 times daily      levothyroxine (SYNTHROID) 175 MCG tablet Take 1 tablet by mouth daily (Patient taking differently: Take 150 mcg by mouth daily ) 30 tablet 5    melatonin 3 MG TABS tablet Take 3 mg by mouth every evening      traMADol (ULTRAM) 50 MG tablet Take 50 mg by mouth 2 times daily.  Give 1/2 tablet every 8 hours for chronic knee pain      Lactobacillus (PROBIOTIC ACIDOPHILUS) TABS Take 1 tablet by mouth daily 5 tablet 0    magnesium hydroxide (MILK OF MAGNESIA) 400 MG/5ML suspension Take 30 mLs by mouth daily as needed for Constipation      simethicone (MYLICON) 80 MG chewable tablet Take 80 mg by mouth 2 times daily      acetaminophen (TYLENOL) 325 MG tablet Take 650 mg by mouth 2 times daily      lisinopril-hydrochlorothiazide (PRINZIDE;ZESTORETIC) 20-12.5 MG per tablet Take 2 tablets by mouth daily      atorvastatin (LIPITOR) 20 MG tablet Take 20 mg by mouth daily      lamoTRIgine (LAMICTAL) 100 MG tablet Take 100 mg by mouth every evening       furosemide (LASIX) 20 MG tablet Take 20 mg by mouth every other day       Loratadine 10 MG CAPS Take 10 mg by mouth daily       meloxicam (MOBIC) 7.5 MG tablet Take 15 mg by mouth daily       calcium-vitamin D (OSCAL-500) 500-200 MG-UNIT per tablet Take 1 tablet by mouth daily       potassium chloride (MICRO-K) 10 MEQ extended release capsule Take 10 mEq by mouth daily Every other day      venlafaxine (EFFEXOR) 75 MG tablet Take 75 mg by mouth every morning       predniSONE (DELTASONE) 10 MG tablet Take 10 mg by mouth every morning      hydrOXYzine (ATARAX) 10 MG tablet Take 10 mg by mouth 3 times daily as needed for Itching      acetaminophen (TYLENOL) 325 MG tablet Take 650 mg by mouth every 4 hours as needed for Pain      hydrocerin (EUCERIN) lotion 2 times daily for dry skin 1 Bottle 0    vitamin D (ERGOCALCIFEROL) 90839 UNITS capsule Take 1 capsule by mouth once a week for 7 doses 7 capsule 0    ferrous sulfate (FE TABS) 325 (65 FE) MG EC tablet Take 1 tablet by mouth 2 times daily 90 tablet 3    sodium hypochlorite (DAKINS) 0.2-0.25 % external solution Apply 1 Bottle topically once Apply topically once.  OLANZapine (ZYPREXA) 2.5 MG tablet Take 5 mg by mouth 2 times daily       HYDROcodone-acetaminophen (NORCO) 5-325 MG per tablet Take 1 tablet by mouth every 6 hours as needed for Pain .  diclofenac sodium 1 % GEL Apply 2 g topically three times daily      dextromethorphan-guaiFENesin (MUCINEX DM)  MG per extended release tablet Take 1 tablet by mouth every 12 hours as needed      Sodium Phosphates (FLEET) 7-19 GM/118ML Place 1 enema rectally once as needed      diphenhydrAMINE (BENADRYL) 25 MG tablet Take 25 mg by mouth nightly as needed for Itching      bisacodyl (DULCOLAX) 10 MG suppository Place 10 mg rectally daily as needed for Constipation      exenatide (BYETTA) 5 MCG/0.02ML injection Inject 5 mcg into the skin 2 times daily (with meals)      carvedilol (COREG) 25 MG tablet Take 25 mg by mouth 2 times daily (with meals)      Multiple Vitamins-Minerals (CERTAVITE/ANTIOXIDANTS PO) Take by mouth 2 times daily       dextromethorphan (DELSYM) 30 MG/5ML extended release liquid Take 30 mg by mouth 2 times daily as needed for Cough       carBAMazepine (TEGRETOL) 100 MG chewable tablet Take 100 mg by mouth daily          ALLERGIES   Allergies   Allergen Reactions    Bactrim [Sulfamethoxazole-Trimethoprim] Rash       FAMILY OR SOCIAL HISTORY   History reviewed. No pertinent family history.   Social History     Socioeconomic History    Marital status: Single     Spouse name: Not on file    Number of children: Not on file    Years of education: Not on file    Highest education level: Not on file   Occupational History    Not on file   Social Needs    Financial resource strain: Not hard at all    Food insecurity     Worry: Never true     Inability: Never true   [employer] needs     Medical: No     Non-medical: No   Tobacco Use  Smoking status: Never Smoker    Smokeless tobacco: Never Used   Substance and Sexual Activity    Alcohol use: No     Frequency: Never     Binge frequency: Never    Drug use: No    Sexual activity: Never   Lifestyle    Physical activity     Days per week: 0 days     Minutes per session: 0 min    Stress: Not at all   Relationships    Social connections     Talks on phone: Once a week     Gets together: Once a week     Attends Islam service: 1 to 4 times per year     Active member of club or organization: Yes     Attends meetings of clubs or organizations: 1 to 4 times per year     Relationship status: Not on file    Intimate partner violence     Fear of current or ex partner: No     Emotionally abused: No     Physically abused: No     Forced sexual activity: No   Other Topics Concern    Not on file   Social History Narrative    Not on file         PHYSICAL EXAM   VITAL SIGNS: BP (!) 115/95   Pulse 83   Temp 96.6 °F (35.9 °C) (Temporal)   Resp 20   SpO2 94%   Constitutional: Well developed, well nourished, no acute distress  Eyes: Pupils equally round and reactive to light, sclera nonicteric  HENT: atraumatic, slightly dry mucous membranes  NECK: Normal range of motion, no JVD  Respiratory: No respiratory distress  Cardiovascular: reg rate  GI: Soft, nondistended, nontender  Musculoskeletal: No edema, no acute deformities   Integument: Skin is warm and dry, no obvious rash   Vascular: Radial pulses 2+ equal bilaterally  Neurologic: awake, alert, oriented x3  Psychiatric: pleasant affect, does not appear anxious         RADIOLOGY/PROCEDURES   XR CHEST PORTABLE   Final Result   Mild left basilar atelectasis. No focal consolidation is seen to suggest   pneumonia. ED COURSE & MEDICAL DECISION MAKING   Pertinent Labs & Imaging studies reviewed and interpreted. (See chart for details)  See chart for details of medications given during the ED stay.     Vitals:    10/07/20 1327   BP: (!) 115/95   Pulse: 83   Resp: 20   Temp: 96.6 °F (35.9 °C)   TempSrc: Temporal   SpO2: 94%       Differential diagnosis: Dehydration, potentially/metabolic problem, anemia, infection, hypotension, Stroke, Structural CNS mass lesion, other    MDM: Patient with acute renal insufficiency. Will admit for treatment. I discussed this with Dr. Cindi Templeton. FINAL IMPRESSION   1.  Acute renal insufficiency        PLAN  Dawn Hoffman MD  10/07/20 4922

## 2020-10-07 NOTE — PROGRESS NOTES
Patient admitted to [facility] from ER. Patient alert and oriented from [facility]. Patient denies any pain or shortness of breath. Updated sister on phone. Patient changed and geetha care given for incontinence. Bed alarm in place.

## 2020-10-07 NOTE — ED NOTES
This writer attempted to call pt's sister Violet Hinton with no answer, no message left     Bharat Chaney RN  10/07/20 0478

## 2020-10-08 LAB
ANION GAP SERPL CALCULATED.3IONS-SCNC: 12 MMOL/L (ref 9–17)
BUN BLDV-MCNC: 40 MG/DL (ref 8–23)
BUN/CREAT BLD: 17 (ref 9–20)
CALCIUM SERPL-MCNC: 9.9 MG/DL (ref 8.6–10.4)
CHLORIDE BLD-SCNC: 105 MMOL/L (ref 98–107)
CO2: 23 MMOL/L (ref 20–31)
CREAT SERPL-MCNC: 2.29 MG/DL (ref 0.5–0.9)
GFR AFRICAN AMERICAN: 25 ML/MIN
GFR NON-AFRICAN AMERICAN: 21 ML/MIN
GFR SERPL CREATININE-BSD FRML MDRD: ABNORMAL ML/MIN/{1.73_M2}
GFR SERPL CREATININE-BSD FRML MDRD: ABNORMAL ML/MIN/{1.73_M2}
GLUCOSE BLD-MCNC: 83 MG/DL (ref 70–99)
POTASSIUM SERPL-SCNC: 3.9 MMOL/L (ref 3.7–5.3)
SARS-COV-2, RAPID: NOT DETECTED
SARS-COV-2: NORMAL
SARS-COV-2: NORMAL
SODIUM BLD-SCNC: 140 MMOL/L (ref 135–144)
SOURCE: NORMAL

## 2020-10-08 PROCEDURE — 97162 PT EVAL MOD COMPLEX 30 MIN: CPT

## 2020-10-08 PROCEDURE — 97166 OT EVAL MOD COMPLEX 45 MIN: CPT

## 2020-10-08 PROCEDURE — 6360000002 HC RX W HCPCS: Performed by: INTERNAL MEDICINE

## 2020-10-08 PROCEDURE — 97110 THERAPEUTIC EXERCISES: CPT

## 2020-10-08 PROCEDURE — 80048 BASIC METABOLIC PNL TOTAL CA: CPT

## 2020-10-08 PROCEDURE — 6360000002 HC RX W HCPCS: Performed by: NURSE PRACTITIONER

## 2020-10-08 PROCEDURE — 36415 COLL VENOUS BLD VENIPUNCTURE: CPT

## 2020-10-08 PROCEDURE — U0002 COVID-19 LAB TEST NON-CDC: HCPCS

## 2020-10-08 PROCEDURE — 2580000003 HC RX 258: Performed by: NURSE PRACTITIONER

## 2020-10-08 PROCEDURE — 2580000003 HC RX 258: Performed by: INTERNAL MEDICINE

## 2020-10-08 PROCEDURE — 1200000000 HC SEMI PRIVATE

## 2020-10-08 PROCEDURE — 6370000000 HC RX 637 (ALT 250 FOR IP): Performed by: INTERNAL MEDICINE

## 2020-10-08 PROCEDURE — 97535 SELF CARE MNGMENT TRAINING: CPT

## 2020-10-08 RX ADMIN — SODIUM CHLORIDE: 9 INJECTION, SOLUTION INTRAVENOUS at 18:37

## 2020-10-08 RX ADMIN — ATORVASTATIN CALCIUM 20 MG: 20 TABLET, FILM COATED ORAL at 07:31

## 2020-10-08 RX ADMIN — LAMOTRIGINE 100 MG: 100 TABLET ORAL at 17:07

## 2020-10-08 RX ADMIN — Medication 3 MG: at 20:44

## 2020-10-08 RX ADMIN — AMANTADINE HYDROCHLORIDE 100 MG: 100 CAPSULE ORAL at 20:44

## 2020-10-08 RX ADMIN — WATER 1 G: 1 INJECTION INTRAMUSCULAR; INTRAVENOUS; SUBCUTANEOUS at 09:44

## 2020-10-08 RX ADMIN — LEVOTHYROXINE SODIUM 150 MCG: 150 TABLET ORAL at 07:30

## 2020-10-08 RX ADMIN — TRAMADOL HYDROCHLORIDE 50 MG: 50 TABLET, FILM COATED ORAL at 08:51

## 2020-10-08 RX ADMIN — PANTOPRAZOLE SODIUM 40 MG: 40 TABLET, DELAYED RELEASE ORAL at 07:30

## 2020-10-08 RX ADMIN — AMANTADINE HYDROCHLORIDE 100 MG: 100 CAPSULE ORAL at 08:51

## 2020-10-08 RX ADMIN — CARBAMAZEPINE 100 MG: 100 TABLET, CHEWABLE ORAL at 08:51

## 2020-10-08 RX ADMIN — LAMOTRIGINE 50 MG: 25 TABLET ORAL at 08:51

## 2020-10-08 RX ADMIN — VENLAFAXINE 75 MG: 75 TABLET ORAL at 08:51

## 2020-10-08 RX ADMIN — CARVEDILOL 25 MG: 25 TABLET, FILM COATED ORAL at 17:07

## 2020-10-08 RX ADMIN — AMLODIPINE BESYLATE 5 MG: 5 TABLET ORAL at 07:30

## 2020-10-08 RX ADMIN — OLANZAPINE 5 MG: 5 TABLET, FILM COATED ORAL at 20:44

## 2020-10-08 RX ADMIN — TRAMADOL HYDROCHLORIDE 50 MG: 50 TABLET, FILM COATED ORAL at 20:44

## 2020-10-08 RX ADMIN — SODIUM CHLORIDE: 9 INJECTION, SOLUTION INTRAVENOUS at 04:59

## 2020-10-08 RX ADMIN — OLANZAPINE 5 MG: 5 TABLET, FILM COATED ORAL at 08:51

## 2020-10-08 RX ADMIN — ENOXAPARIN SODIUM 40 MG: 40 INJECTION, SOLUTION INTRAVENOUS; SUBCUTANEOUS at 08:51

## 2020-10-08 RX ADMIN — CARVEDILOL 25 MG: 25 TABLET, FILM COATED ORAL at 07:30

## 2020-10-08 ASSESSMENT — PAIN SCALES - GENERAL
PAINLEVEL_OUTOF10: 0

## 2020-10-08 NOTE — PLAN OF CARE
Problem: Falls - Risk of:  Goal: Will remain free from falls  Description: Will remain free from falls  10/8/2020 0921 by Sacha Shea  Outcome: Ongoing  Note: Call light in reach at all times, frequent checks, bed in lowest position, wheels of bed and chair locked, non skid footwear on, appropriate transfer techniques, personal items within reach, walkways free of obstructions, fall risk armband and sign displayed, Mendez fall risk score per protocol. No falls this shift, will continue to monitor. Problem: Skin Integrity:  Goal: Will show no infection signs and symptoms  Description: Will show no infection signs and symptoms  10/8/2020 0921 by Sacha Shea  Outcome: Ongoing  Note: Gabriele scale monitoring per protocol. Inspect skin for breakdown frequently. Encourage pt to make frequent large adjustments in position or assist patient with turning. Document all areas of breakdown.

## 2020-10-08 NOTE — PROGRESS NOTES
Physical Therapy    Facility/Department: [address] [place] [address] MED SURG  Initial Assessment    NAME: Vira Mabry  : 1950  MRN: [id number]    Date of Service: 10/8/2020    Discharge Recommendations:  ECF with PT, ECF without PT, Continue to assess pending progress   PT Equipment Recommendations  Equipment Needed: No    Assessment   Body structures, Functions, Activity limitations: Decreased functional mobility ; Decreased ADL status; Decreased ROM; Decreased strength;Decreased endurance;Decreased high-level IADLs;Decreased balance;Decreased posture  Assessment: The patient is a 65 y.o. female who was admitted due to acute kidney injury. She resides at [facility] and uses a W/C for mobility. She demonstrates LE weakness, and impaired seated balance. She would benefit from skilled PT to address her deficits to decrease the need for assistance with bed mobility and transfers. Treatment Diagnosis: Generalized weakness  Prognosis: Fair  Decision Making: Medium Complexity  REQUIRES PT FOLLOW UP: Yes  Activity Tolerance  Activity Tolerance: Patient limited by fatigue;Patient limited by endurance       Patient Diagnosis(es): The encounter diagnosis was Acute renal insufficiency. has a past medical history of Bipolar disorder ([address] Cataracts, bilateral, Dementia ([address] Depression, Enthesopathy, Hyperlipidemia, Hypertension, Hypothyroidism, MRSA (methicillin resistant staph aureus) culture positive, Obesity, Osteoarthritis, Person living in residential institution, and Type 2 diabetes mellitus ([address] has a past surgical history that includes Hysterectomy.     Restrictions  Restrictions/Precautions  Restrictions/Precautions: General Precautions, Fall Risk, Contact Precautions  Vision/Hearing  Vision: Impaired  Vision Exceptions: Wears glasses at all times  Hearing: Within functional limits     Subjective  General  Chart Reviewed: Yes  Patient assessed for rehabilitation services?: Yes  Family / Caregiver Present: No  Referring Practitioner: MADAN Berumen CNP  Referral Date : 10/08/20  Diagnosis: Acute Kidney injury, N17.9  Follows Commands: Within Functional Limits  Subjective  Subjective: Pt denies pain at this time. Pain Screening  Patient Currently in Pain: Denies    Orientation  Orientation  Overall Orientation Status: Within Functional Limits  Social/Functional History  Social/Functional History  Type of Home: Facility  Home Layout: One level  Home Access: Level entry  Bathroom Shower/Tub: Walk-in shower  Bathroom Accessibility: Accessible  Home Equipment: [employer] [address] Help From: Other (comment)  ADL Assistance: Needs assistance  Homemaking Assistance: Needs assistance  Homemaking Responsibilities: No  Ambulation Assistance: Needs assistance  Transfer Assistance: Needs assistance  Active : No  Additional Comments: Pt is a poor historian with difficulty responding appropriately to questions. Per RN, pt lives at [facility] and uses a wheelchair for mobility  Cognition   Cognition  Overall Cognitive Status: Exceptions  Arousal/Alertness: Delayed responses to stimuli  Following Commands: Follows one step commands consistently  Attention Span: Attends with cues to redirect  Memory: Decreased short term memory;Decreased long term memory  Safety Judgement: Decreased awareness of need for safety  Problem Solving: Assistance required to generate solutions;Decreased awareness of errors  Insights: Decreased awareness of deficits  Initiation: Requires cues for some  Sequencing: Requires cues for some    Objective    AROM RLE (degrees)  RLE AROM: WFL  AROM LLE (degrees)  LLE AROM : WFL  Strength RLE  Comment: Grossly 3+/5  Strength LLE  Comment: Grossly 3+/5     Sensation  Overall Sensation Status: WFL  Bed mobility  Rolling to Left: Moderate assistance  Rolling to Right: Moderate assistance  Supine to Sit: Moderate assistance  Scooting:  Moderate assistance  Transfers  Sit to Stand: Contact guard assistance  Stand to sit: Contact guard assistance  Ambulation  Ambulation?: No  Stairs/Curb  Stairs?: No     Balance  Posture: Poor  Sitting - Static: Good  Sitting - Dynamic: Fair;+  Standing - Static: Poor;+  Standing - Dynamic: Poor        Plan   Plan  Times per week: 7  Times per day: Twice a day  Plan Comment: 1x/day on weekends  Safety Devices  Type of devices: All fall risk precautions in place    AM-PAC Score     AM-PAC Inpatient Mobility without Stair Climbing Raw Score : 12 (10/08/20 1529)  AM-PAC Inpatient without Stair Climbing T-Scale Score : 37.26 (10/08/20 1529)  Mobility Inpatient CMS 0-100% Score: 63.03 (10/08/20 1529)  Mobility Inpatient without Stair CMS G-Code Modifier : CL (10/08/20 1529)    Goals  Short term goals  Time Frame for Short term goals: 10 days  Short term goal 1: Patient will perform stand pivot transfer with CGA  Short term goal 2: Patient will perform bed mobility with SBA  Short term goal 3: Patient will tolerate 20-30 minutes of therex/act to improve endurance for ADLs.   Patient Goals   Patient goals : Improve mobility       Therapy Time   Individual Concurrent Group Co-treatment   Time In 1020         Time Out 1036         Minutes 16         Timed Code Treatment Minutes: 16 Minutes       Lorrin Litten, PT, DPT

## 2020-10-08 NOTE — PROGRESS NOTES
SW met with pt to complete assessment this morning. Pt is alert and pleasant during conversation. Pt is a 65year old female admitted for acute renal insufficiency. Pt lives at [facility] and states that she has been there about 10 years. Pt reports that she uses a walker and a wheelchair at the facility. Staff provides for pt's needs. Pt states that the facility can transport her for appointments. Pt is a DNR CCA code status and follows with Dr Talya Green as PCP. Pt has advance directives and they are in her medical record. Pt  medications are covered well for her. Pt plans to return to [facility] at discharge. Pt identifies no discharge needs. SW will continue to follow and remain available.  Alden WYATTW 10/8/2020

## 2020-10-08 NOTE — PROGRESS NOTES
Occupational Therapy   Occupational Therapy Initial Assessment  Date: 10/8/2020   Patient Name: Blake Roman  MRN: [id number]     : 1950    Date of Service: 10/8/2020    Discharge Recommendations:  Subacute/Skilled Nursing Facility       Assessment   Performance deficits / Impairments: Decreased functional mobility ; Decreased ADL status; Decreased strength;Decreased endurance;Decreased balance  Assessment: Pt is a 64 y/o female admitted d/t acute kidney injury. Pt presents with decreased functional mobility, seated and standing balance, and functional activity tolerance. Pt to benefit from OT services to address these deficts and maximize independence with ADLs. Treatment Diagnosis: generalized weakness  Prognosis: Fair  Decision Making: Medium Complexity  OT Education: OT Role;Plan of Care;Transfer Training  Patient Education: safe transfer techniques  Barriers to Learning: cognition  REQUIRES OT FOLLOW UP: Yes  Activity Tolerance  Activity Tolerance: Patient limited by fatigue  Safety Devices  Safety Devices in place: Yes  Type of devices: Chair alarm in place; Left in chair;Patient at risk for falls;Call light within reach;Nurse notified           Patient Diagnosis(es): The encounter diagnosis was Acute renal insufficiency. has a past medical history of Bipolar disorder ([address] Cataracts, bilateral, Dementia ([address] Depression, Enthesopathy, Hyperlipidemia, Hypertension, Hypothyroidism, MRSA (methicillin resistant staph aureus) culture positive, Obesity, Osteoarthritis, Person living in residential institution, and Type 2 diabetes mellitus ([address] has a past surgical history that includes Hysterectomy.     Treatment Diagnosis: generalized weakness      Restrictions  Restrictions/Precautions  Restrictions/Precautions: General Precautions, Fall Risk, Contact Precautions    Subjective   General  Chart Reviewed: Yes  Patient assessed for rehabilitation services?: Yes  Family / Caregiver Present: No  Referring Practitioner: Nirmala Rendon CNP  Diagnosis: acute kidney injury  Subjective  Subjective: Pt reports 0/10 pain this date. General Comment  Comments: Pt in bed upon OT arrival. Pt poor historian with difficulty responding to questions. Able to follow simple commands. Agreeable to evaluation. Height and Weight  Height: 5' 4\" (162.6 cm)  Social/Functional History  Social/Functional History  Type of Home: Facility  Home Layout: One level  Home Access: Level entry  Bathroom Shower/Tub: Walk-in shower  Bathroom Accessibility: Accessible  Home Equipment: [employer] Help From: Other (comment)(staff)  ADL Assistance: Needs assistance  Homemaking Assistance: Needs assistance  Homemaking Responsibilities: No  Ambulation Assistance: Needs assistance  Transfer Assistance: Needs assistance  Active : No  Additional Comments: Pt is a poor historian with difficulty responding appropriately to questions. Per RN, pt lives at [facility] and uses a wheelchair for mobility       Objective   Vision: Impaired  Vision Exceptions: Wears glasses at all times  Hearing: Within functional limits    Orientation  Overall Orientation Status: Impaired  Orientation Level: Oriented to person     Balance  Sitting Balance: Minimal assistance  Standing Balance: Moderate assistance  Standing Balance  Time: 30 seconds  Activity: transfer EOB to chair  Comment: poor standing balance with mod A needed  Functional Mobility  Functional - Mobility Device: No device  Activity: (transfer from EOB to chair)  Assist Level: Moderate assistance  Functional Mobility Comments: pt took several steps to transfer from EOB to chair with mod A  ADL  Feeding: Contact guard assistance  Grooming: Minimal assistance  UE Bathing: Moderate assistance  LE Bathing: Maximum assistance  UE Dressing:  Moderate assistance  LE Dressing: Maximum assistance  Toileting: Maximum assistance        Bed mobility  Rolling to Left: Moderate activity tolerance. Short term goal 3: Pt will perform LB ADLs using AE as needed with min A for increased independence with ADLs. Short term goal 4: Pt will complete grooming tasks while seated with SBA for dynamic seated balance.        Therapy Time   Individual Concurrent Group Co-treatment   Time In 1028         Time Out 1046         Minutes [address]

## 2020-10-08 NOTE — H&P
History and Physical    Patient:  Camilla Alan  MRN: [id number]    Chief Complaint:  NH reported weakness. Pt States \"I don't know why I am here\"    History Obtained From:  patient, electronic medical record    PCP: Bruce Jacobson MD    History of Present Illness: The patient is a 65 y.o. female who presented to the ER yesterday from [facility] due to increased weakness. They reported this was constant since onset. They reported generalized weakness. The patient denied pain. She denied urinary dyruia or frequency. She denied CP or palpitations. She denied SOB or cough. She is non-ambulatory at the NH and uses a wheelchair. Past Medical History:        Diagnosis Date    B[facility] ([facility]     Cataracts, bilateral     Dementia ([facility]     Depression     Enthesopathy     Hyperlipidemia     Hypertension     Hypothyroidism     MRSA (methicillin resistant staph aureus) culture positive 04/22/2017    nares    Obesity     Osteoarthritis     Person living in residential institution     Type 2 diabetes mellitus ([facility]        Past Surgical History:        Procedure Laterality Date    HYSTERECTOMY         Family History:   History reviewed. No pertinent family history. Social History:   TOBACCO:   reports that she has never smoked. She has never used smokeless tobacco.  ETOH:   reports no history of alcohol use. ELICIT DRUG USE:    Social History     Substance and Sexual Activity   Drug Use No     OCCUPATION: disabled      Allergies:  Bactrim [sulfamethoxazole-trimethoprim]    Medications Prior to Admission:    Prior to Admission medications    Medication Sig Start Date End Date Taking?  Authorizing Provider   Valbenazine Tosylate (INGREZZA) 40 MG CAPS Take 40 mg by mouth daily   Yes Historical Provider, MD   lamoTRIgine (LAMICTAL) 25 MG tablet Take 50 mg by mouth daily In the morning   Yes Historical Provider, MD   amLODIPine (NORVASC) 5 MG tablet Take 5 mg by mouth daily   Yes Historical Provider, MD   omeprazole (PRILOSEC) 20 MG delayed release capsule Take 20 mg by mouth daily   Yes Historical Provider, MD   Dulaglutide (TRULICITY) 9.19 LM/7.7CV SOPN Inject 0.75 mg into the skin once a week Every monday   Yes Historical Provider, MD   Amantadine (SYMMETREL) 100 MG TABS tablet Take 100 mg by mouth 2 times daily   Yes Historical Provider, MD   levothyroxine (SYNTHROID) 175 MCG tablet Take 1 tablet by mouth daily  Patient taking differently: Take 150 mcg by mouth daily  5/1/19  Yes Renetta Alvarez MD   melatonin 3 MG TABS tablet Take 3 mg by mouth every evening   Yes Historical Provider, MD   traMADol (ULTRAM) 50 MG tablet Take 50 mg by mouth every 8 hours.     Yes Historical Provider, MD   Lactobacillus (PROBIOTIC ACIDOPHILUS) TABS Take 1 tablet by mouth daily 4/24/17  Yes Trae Krueger MD   magnesium hydroxide (MILK OF MAGNESIA) 400 MG/5ML suspension Take 30 mLs by mouth daily as needed for Constipation   Yes Historical Provider, MD   simethicone (MYLICON) 80 MG chewable tablet Take 80 mg by mouth 2 times daily   Yes Historical Provider, MD   acetaminophen (TYLENOL) 325 MG tablet Take 650 mg by mouth 2 times daily   Yes Historical Provider, MD   lisinopril-hydrochlorothiazide (PRINZIDE;ZESTORETIC) 20-12.5 MG per tablet Take 2 tablets by mouth daily   Yes Historical Provider, MD   atorvastatin (LIPITOR) 20 MG tablet Take 20 mg by mouth daily   Yes Historical Provider, MD   lamoTRIgine (LAMICTAL) 100 MG tablet Take 100 mg by mouth every evening    Yes Historical Provider, MD   furosemide (LASIX) 20 MG tablet Take 20 mg by mouth every other day    Yes Historical Provider, MD   Loratadine 10 MG CAPS Take 10 mg by mouth daily    Yes Historical Provider, MD   meloxicam (MOBIC) 7.5 MG tablet Take 15 mg by mouth daily    Yes Historical Provider, MD   calcium-vitamin D (OSCAL-500) 500-200 MG-UNIT per tablet Take 1 tablet by mouth daily    Yes Historical Provider, MD   potassium chloride (MICRO-K) 10 MEQ extended release capsule Take 10 mEq by mouth daily Every other day   Yes Historical Provider, MD   venlafaxine (EFFEXOR) 75 MG tablet Take 75 mg by mouth every morning    Yes Historical Provider, MD   hydrOXYzine (ATARAX) 10 MG tablet Take 10 mg by mouth 3 times daily as needed for Itching    Historical Provider, MD   acetaminophen (TYLENOL) 325 MG tablet Take 650 mg by mouth every 4 hours as needed for Pain    Historical Provider, MD   hydrocerin (EUCERIN) lotion 2 times daily for dry skin 4/24/17   Edith Gu MD   vitamin D (ERGOCALCIFEROL) 17924 UNITS capsule Take 1 capsule by mouth once a week for 7 doses 4/24/17 6/6/17  Edith Gu MD   ferrous sulfate (FE TABS) 325 (65 FE) MG EC tablet Take 1 tablet by mouth 2 times daily 4/24/17   Edith Gu MD   HYDROcodone-acetaminophen (NORCO) 5-325 MG per tablet Take 1 tablet by mouth every 6 hours as needed for Pain . Historical Provider, MD   diclofenac sodium 1 % GEL Apply 2 g topically three times daily    Historical Provider, MD   dextromethorphan-guaiFENesin (MUCINEX DM)  MG per extended release tablet Take 1 tablet by mouth every 12 hours as needed    Historical Provider, MD   diphenhydrAMINE (BENADRYL) 25 MG tablet Take 25 mg by mouth nightly as needed for Itching    Historical Provider, MD   bisacodyl (DULCOLAX) 10 MG suppository Place 10 mg rectally daily as needed for Constipation    Historical Provider, MD   dextromethorphan (DELSYM) 30 MG/5ML extended release liquid Take 30 mg by mouth 2 times daily as needed for Cough     Historical Provider, MD   carBAMazepine (TEGRETOL) 100 MG chewable tablet Take 100 mg by mouth daily     Historical Provider, MD       Review of Systems: Per pt and ER record  Constitutional:negative  for fevers, and negative for chills.  Positive weakness  Eyes: negative for visual disturbance   ENT: negative for sore throat, negative nasal congestion, and negative for earache  Respiratory: negative for shortness of breath, negative for cough, and negative for wheezing  Cardiovascular: negative for chest pain, negative for palpitations, and negative for syncope  Gastrointestinal: negative for abdominal pain, negative for nausea,negative for vomiting, negative for diarrhea, negative for constipation, and negative for hematochezia or melena  Genitourinary: negative for dysuria, negative for urinary urgency, negative for urinary frequency, and negative for hematuria  Skin: negative for skin rash, and negative for skin lesions  Neurological: negative for unilateral weakness, numbness or tingling. Physical Exam:    Vitals:   Temp: 97.8 °F (36.6 °C)  BP: (!) 121/53  Resp: 18  Pulse: 88  SpO2: 96 %  24HR INTAKE/OUTPUT:      Intake/Output Summary (Last 24 hours) at 10/8/2020 4005  Last data filed at 10/8/2020 0708  Gross per 24 hour   Intake 2262.69 ml   Output --   Net 2262.69 ml       Exam:  GEN:  well-developed and well nourished, in no acute distress, alert, disoriented and obese  EYES: No gross abnormalities. , PERRL and EOMI  NECK: normal, supple, no lymphadenopathy,  no carotid bruits  PULM: clear to auscultation bilaterally- no wheezes, rales or rhonchi, normal air movement, no respiratory distress  COR: regular rate & rhythm, no murmurs, no gallops, S1 normal and S2 normal  ABD:  soft, non-tender, non-distended, normal bowel sounds, no masses or organomegaly  EXT:   no cyanosis, clubbing or edema present    NEURO: follows commands, DAVIS, no deficits. States she lives in [facility], does not know the year or president.    SKIN:  no rashes or significant lesions  -----------------------------------------------------------------  Diagnostic Data:   Lab Results   Component Value Date    WBC 5.3 10/07/2020    HGB 11.4 (L) 10/07/2020     10/07/2020       Lab Results   Component Value Date    BUN 40 (H) 10/08/2020    CREATININE 2.29 (H) 10/08/2020     10/08/2020    K 3.9 10/08/2020    CALCIUM 9.9 10/08/2020     10/08/2020    CO2 23 10/08/2020    LABGLOM 21 (L) 10/08/2020       Lab Results   Component Value Date    WBCUA 2 TO 5 10/06/2020    RBCUA 0 TO 2 10/06/2020    EPITHUA 2 TO 5 10/06/2020    LEUKOCYTESUR TRACE (A) 10/06/2020    SPECGRAV >1.030 (H) 10/06/2020    GLUCOSEU NEGATIVE 10/06/2020    KETUA TRACE (A) 10/06/2020    PROTEINU NEGATIVE 10/06/2020    HGBUR NEGATIVE 10/06/2020    CASTUA 2 TO 5 HYALINE 10/06/2020    CRYSTUA NOT REPORTED 10/06/2020    BACTERIA 1+ (A) 10/06/2020    YEAST NOT REPORTED 10/06/2020       Lab Results   Component Value Date    MYOGLOBIN 42 04/23/2017    TROPONINT <0.03 09/03/2017    CKTOTAL 154 04/21/2017    CKMB 1.5 04/21/2017    PROBNP 1,695 (H) 04/21/2017       Xr Chest Portable    Result Date: 10/7/2020  EXAMINATION: ONE XRAY VIEW OF THE CHEST 10/7/2020 2:39 pm COMPARISON: Chest September 3, 2017. HISTORY: ORDERING SYSTEM PROVIDED HISTORY: weak TECHNOLOGIST PROVIDED HISTORY: weak FINDINGS: Heart appears normal in size. Evidence of old granulomas disease. Mild left basilar atelectasis. No focal consolidation, pneumothorax, large pleural effusion or free air. Mild left basilar atelectasis. No focal consolidation is seen to suggest pneumonia. Assessment:    Active Problems:    Type 2 diabetes mellitus without complication, without long-term current use of insulin (HCC)    Acute kidney injury ([address]  Resolved Problems:    * No resolved hospital problems.  *      Patient Active Problem List    Diagnosis Date Noted    Acute kidney injury ([address] 10/07/2020    Primary osteoarthritis of both knees     Morbid obesity ([address] 03/27/2017    Bipolar disorder ([address]     Hypothyroidism     Type 2 diabetes mellitus without complication, without long-term current use of insulin ([address]     Essential hypertension, benign 08/31/2015    Person living in residential institution        Plan:     · This patient requires inpatient admission because of TREE  · Factors affecting the medical complexity of this patient include Type 2 DM, Bipolar Disorder  · Estimated length of stay is 3 days  · TREE  · Fluid bolus of 1 L given in ER  · Continue IV NS at 75 ml/hr  · BMP daily -- Baseline is Creatinine 1.30-1.66 -- Creatinine 3.34 on admission with GFR 14, today Creatinine 2.29 with GFR 21  · Type 2 DM -- Controlled  · BMP daily  · Bipolar Disorder  · Continue Lamictal, Effexor  · UTI with EColi  · Continue NS at 75 ml/hr  · Start Rocephin 1 gm IV daily  · Discharge Plan:  Return to [facility] when stable  · Non-Ambulatory  · PT/OT  · Covid-19 -- Pending  · High risk medication monitoring: None    CORE MEASURES  DVT prophylaxis: Lovenox  Decubitus ulcer present on admission: No  CODE STATUS: DNR-CCA  Nutrition Status: good   Physical therapy: No   Old Charts reviewed: Yes  EKG Reviewed: No  Advance Directive Addressed:  \"Yes - in chart    MADAN Saldivar, NP-C  10/8/2020, 9:09 AM

## 2020-10-08 NOTE — DISCHARGE INSTR - COC
Continuity of Care Form    Patient Name: Jurgen Almanzar   :  1950  MRN:  [id number]    Admit date:  10/7/2020  Discharge date:  10/09/2020    Code Status Order: DNR-CCA   Advance Directives:   [facility] Documentation       Date/Time Healthcare Directive Type of Healthcare Directive Copy in [facility] [address] Agent's Name Healthcare Agent's Phone Number    10/07/20 [phone]  Yes, patient has an advance directive for healthcare treatment  --  --  --  --  --            Admitting Physician:  Citlali Blackmon MD  PCP: Citlali Blackmon MD    Discharging Nurse: [address] [place] [address] Unit/Room#: 0219/9409-06  Discharging Unit Phone Number: [phone]    Emergency Contact:   Extended Emergency Contact Information  Primary Emergency Contact: Wilman Tamez [address] Phone: [phone]  Mobile Phone: [phone]  Relation: Brother/Sister  Hearing or visual needs: None  Other needs: None  Preferred language: English   needed? No  Secondary Emergency Contact: Francine Gomez [address] Phone: [phone]  Mobile Phone: [phone]  Relation: Brother/Sister  Hearing or visual needs: None  Other needs: None  Preferred language: English   needed?  No    Past Surgical History:  Past Surgical History:   Procedure Laterality Date    HYSTERECTOMY         Immunization History:   Immunization History   Administered Date(s) Administered    Influenza Vaccine, unspecified formulation 10/10/2016    Influenza Virus Vaccine 10/27/2016       Active Problems:  Patient Active Problem List   Diagnosis Code    Person living in residential institution Z59.3    Essential hypertension, benign I10    Type 2 diabetes mellitus without complication, without long-term current use of insulin (HCC) E11.9    Bipolar disorder ([facility] F31.9    Hypothyroidism E03.9    Morbid obesity ([facility] E66.01    Primary osteoarthritis of both knees M17.0    Acute kidney injury ([facility] N17.9       Isolation/Infection:   Isolation            Contact          Patient Infection Status       Infection Onset Added Last Indicated Last Indicated By Review Planned Expiration Resolved Resolved By    MRSA  04/24/17 04/24/17 ALYSSA Almonte 4/22/2017    Resolved    COVID-19 Rule Out 10/08/20 10/08/20 10/08/20 COVID-19 (Ordered)   10/08/20 Rule-Out Test Resulted            Nurse Assessment:  Last Vital Signs: /70   Pulse 72   Temp 98.6 °F (37 °C) (Temporal)   Resp 18   Ht 5' 4\" (1.626 m)   Wt 231 lb 11.2 oz (105.1 kg)   SpO2 91%   BMI 39.77 kg/m²     Last documented pain score (0-10 scale): Pain Level: (sheduled dose )  Last Weight:   Wt Readings from Last 1 Encounters:   10/09/20 231 lb 11.2 oz (105.1 kg)     Mental Status:  disoriented and alert    IV Access:  - None    Nursing Mobility/ADLs:  Walking   Dependent  Transfer  Dependent  Bathing  Dependent  Dressing  Dependent  Toileting  Dependent  Feeding  Assisted  Med Admin  Assisted  Med Delivery   whole    Wound Care Documentation and Therapy:  Wound 10/12/17 Toe (Comment  which one) Right #1- Right great toe (Active)   Number of days: 1092       Wound 10/12/17 Leg Left;Medial #2-Left medial leg (Active)   Number of days: 1092        Elimination:  Continence:   · Bowel: No  · Bladder: No  Urinary Catheter: None   Colostomy/Ileostomy/Ileal Conduit: No       Date of Last BM: ? Intake/Output Summary (Last 24 hours) at 10/9/2020 1108  Last data filed at 10/9/2020 0816  Gross per 24 hour   Intake 2555.81 ml   Output --   Net 2555.81 ml     I/O last 3 completed shifts: In: 2462.6 [P.O.:600; I.V.:1862.6]  Out: -     Safety Concerns: At Risk for Falls    Impairments/Disabilities:      Speech, vision    Nutrition Therapy:  Current Nutrition Therapy:   - Oral Diet:  Cardiac and Renal    Routes of Feeding: Oral  Liquids:  Thin Liquids  Daily Fluid Restriction: no  Last Modified Barium Swallow with Video (Video Swallowing Test): not done    Treatments at the Time of Hospital Discharge:   Respiratory Treatments: ***  Oxygen Therapy:  is not on home oxygen therapy. Ventilator:    - No ventilator support    Rehab Therapies: Physical Therapy and Occupational Therapy  Weight Bearing Status/Restrictions: No weight bearing restirctions  Other Medical Equipment (for information only, NOT a DME order):  wheelchair and walker  Other Treatments:       Patient's personal belongings (please select all that are sent with patient):  Nikki, Jewelry RN SIGNATUREKristene Kehr RN    CASE MANAGEMENT/SOCIAL WORK SECTION    Inpatient Status Date: 10/7/2020    Readmission Risk Assessment Score:  Readmission Risk              Risk of Unplanned Readmission:        13           Discharging to Facility/ Agency   · Name: Lele Alves  · Address:[facility] [address]  · Phone:[phone]  · Fax:[phone]    Dialysis Facility (if applicable)   · Name:  · Address:  · Dialysis Schedule:  · Phone:  · Fax:    / signature: Electronically signed by SHAHEEN Huffman on 10/7/20 at 9:02 PM EDT    PHYSICIAN SECTION    Prognosis: Good    Condition at Discharge: Stable    Rehab Potential (if transferring to Rehab): Fair    Recommended Labs or Other Treatments After Discharge: BMP in 3 weeks send results to Dr Olimpia Eng and Puja Mckeon CNP    Physician Certification: I certify the above information and transfer of [employer]  is necessary for the continuing treatment of the diagnosis listed and that she requires Intermediate Nursing Care for greater 30 days.      Update Admission H&P: No change in H&P    PHYSICIAN SIGNATURE:  Electronically signed by MADAN León CNP on 10/9/20 at 11:50 AM EDT

## 2020-10-08 NOTE — PROGRESS NOTES
Nutrition Assessment     Type and Reason for Visit: Initial    Nutrition Recommendations/Plan: continue current diet     Nutrition Assessment:  Overweight/obesity r/t decreased PA aeb BMI 39.51, immobile, dependent upon w/c for movement. A1c with good control. Renal indices have improved, GFR is 21. Pt from [facility]. Recommend additon of probiotic due to ATB therapy. Pt only answered a few questions I asked her. Stated of good PO at [facility]. Malnutrition Assessment:  Malnutrition Status: Insufficient data      Nutrition Related Findings: Appears overly nourished      Current Nutrition Therapies:    DIET GENERAL; Carb Control: 4 carb choices (60 gms)/meal; No Added Salt (3-4 GM)    Anthropometric Measures:  · Height: 5' 4\" (162.6 cm)  · Current Body Wt: 230 lb 3.2 oz (104.4 kg)   · BMI: 39.5    Nutrition Diagnosis:   · Overweight/Obese related to other (comment)(decreased PA) as evidenced by BMI(39.51, dependent upon w/c for mobility)      Nutrition Interventions:   Food and/or Nutrient Delivery:  Continue Current Diet  Nutrition Education/Counseling:  No recommendation at this time   Coordination of Nutrition Care:  Continued Inpatient Monitoring    Goals:  PO > 75% of meals      Recent Labs     10/06/20  2310 10/07/20  1414 10/08/20  0545    138 140   K 4.8 4.6 3.9    101 105   CO2 24 26 23   BUN 55* 51* 40*   CREATININE 3.34* 2.63* 2.29*   GLUCOSE 82 121* 83   ALT  --  14  --    ALKPHOS  --  70  --    GFR                                       Lab Results   Component Value Date    LABALBU 4.3 10/07/2020    LABALBU 4.5 04/30/2012      Nutrition Monitoring and Evaluation:   Behavioral-Environmental Outcomes:   Other (Comment)(none)   Food/Nutrient Intake Outcomes:  Food and Nutrient Intake  Physical Signs/Symptoms Outcomes:  Biochemical Data, Weight     Discharge Planning:    Continue current diet     Electronically signed by Neil Stephens RD, LD on 10/8/20 at 12:10 PM EDT    Contact: [phone]

## 2020-10-08 NOTE — PROGRESS NOTES
Physical Therapy  Facility/Department: [facility] MED SURG  Daily Treatment Note  NAME: Gisselle Meyer  : 1950  MRN: [id number]    Date of Service: 10/8/2020    Discharge Recommendations:  ECF with PT, ECF without PT, Continue to assess pending progress        Assessment   Treatment Diagnosis: Generalized weakness  Prognosis: Fair  Patient Education: Patient provided education on importance of daily exercises with fair understanding. REQUIRES PT FOLLOW UP: Yes  Activity Tolerance  Activity Tolerance: Patient limited by fatigue;Patient limited by endurance     Patient Diagnosis(es): The encounter diagnosis was Acute renal insufficiency. has a past medical history of Bipolar disorder ([facility] Cataracts, bilateral, Dementia ([facility] Depression, Enthesopathy, Hyperlipidemia, Hypertension, Hypothyroidism, MRSA (methicillin resistant staph aureus) culture positive, Obesity, Osteoarthritis, Person living in residential institution, and Type 2 diabetes mellitus ([facility] has a past surgical history that includes Hysterectomy. Restrictions  Restrictions/Precautions  Restrictions/Precautions: General Precautions, Fall Risk, Contact Precautions  Subjective   General  Chart Reviewed: Yes  Response To Previous Treatment: Patient with no complaints from previous session. Family / Caregiver Present: No  Referring Practitioner: MADAN Jc CNP  Subjective  Subjective: Pt denies pain at this time. Orientation  Orientation  Overall Orientation Status: Within Functional Limits  Cognition      Objective   Bed mobility  Comment: Pt up in recliner upon arrival.  Transfers  Comment: Pt declined stnding at this time.   Ambulation  Ambulation?: No  Stairs/Curb  Stairs?: No        Exercises  Straight Leg Raise: x15  Quad Sets: x15  Heelslides: x15  Gluteal Sets: x15  Hip Flexion: x15  Hip Abduction: x15  Knee Long Arc Quad: x15  Knee Short Arc Quad: x15  Ankle Pumps: x15  Comments: Pt completed above listed exercises in reclined and seated position. Pt with frequent verbal, visual and/or tactile cues for task and technique. Goals  Short term goals  Time Frame for Short term goals: 10 days  Short term goal 1: Patient will perform stand pivot transfer with CGA  Short term goal 2: Patient will perform bed mobility with SBA  Short term goal 3: Patient will tolerate 20-30 minutes of therex/act to improve endurance for ADLs. Patient Goals   Patient goals : Improve mobility    Plan    Plan  Times per week: 7  Times per day: Twice a day  Plan Comment: 1x/day on weekends  Safety Devices  Type of devices:  All fall risk precautions in place, Call light within reach, Nurse notified     Therapy Time   Individual Concurrent Group Co-treatment   Time In 1318         Time Out 1334         Minutes 16         Timed Code Treatment Minutes: 40 [facility], hospitals

## 2020-10-09 VITALS
RESPIRATION RATE: 18 BRPM | TEMPERATURE: 98.6 F | BODY MASS INDEX: 39.56 KG/M2 | HEIGHT: 64 IN | WEIGHT: 231.7 LBS | OXYGEN SATURATION: 91 % | SYSTOLIC BLOOD PRESSURE: 100 MMHG | HEART RATE: 72 BPM | DIASTOLIC BLOOD PRESSURE: 70 MMHG

## 2020-10-09 LAB
ANION GAP SERPL CALCULATED.3IONS-SCNC: 12 MMOL/L (ref 9–17)
BUN BLDV-MCNC: 39 MG/DL (ref 8–23)
BUN/CREAT BLD: 17 (ref 9–20)
CALCIUM SERPL-MCNC: 9.2 MG/DL (ref 8.6–10.4)
CHLORIDE BLD-SCNC: 107 MMOL/L (ref 98–107)
CO2: 19 MMOL/L (ref 20–31)
CREAT SERPL-MCNC: 2.34 MG/DL (ref 0.5–0.9)
CULTURE: ABNORMAL
GFR AFRICAN AMERICAN: 25 ML/MIN
GFR NON-AFRICAN AMERICAN: 20 ML/MIN
GFR SERPL CREATININE-BSD FRML MDRD: ABNORMAL ML/MIN/{1.73_M2}
GFR SERPL CREATININE-BSD FRML MDRD: ABNORMAL ML/MIN/{1.73_M2}
GLUCOSE BLD-MCNC: 77 MG/DL (ref 70–99)
Lab: ABNORMAL
POTASSIUM SERPL-SCNC: 4.2 MMOL/L (ref 3.7–5.3)
SODIUM BLD-SCNC: 138 MMOL/L (ref 135–144)
SPECIMEN DESCRIPTION: ABNORMAL

## 2020-10-09 PROCEDURE — 36415 COLL VENOUS BLD VENIPUNCTURE: CPT

## 2020-10-09 PROCEDURE — 97110 THERAPEUTIC EXERCISES: CPT

## 2020-10-09 PROCEDURE — 6370000000 HC RX 637 (ALT 250 FOR IP): Performed by: INTERNAL MEDICINE

## 2020-10-09 PROCEDURE — 6360000002 HC RX W HCPCS: Performed by: INTERNAL MEDICINE

## 2020-10-09 PROCEDURE — 2580000003 HC RX 258: Performed by: INTERNAL MEDICINE

## 2020-10-09 PROCEDURE — 6360000002 HC RX W HCPCS: Performed by: NURSE PRACTITIONER

## 2020-10-09 PROCEDURE — 2580000003 HC RX 258: Performed by: NURSE PRACTITIONER

## 2020-10-09 PROCEDURE — 97535 SELF CARE MNGMENT TRAINING: CPT

## 2020-10-09 PROCEDURE — 80048 BASIC METABOLIC PNL TOTAL CA: CPT

## 2020-10-09 PROCEDURE — 97530 THERAPEUTIC ACTIVITIES: CPT

## 2020-10-09 RX ORDER — CEPHALEXIN 250 MG/1
250 CAPSULE ORAL 2 TIMES DAILY
Qty: 10 CAPSULE | Refills: 0 | DISCHARGE
Start: 2020-10-09 | End: 2020-10-14

## 2020-10-09 RX ORDER — CARVEDILOL 25 MG/1
25 TABLET ORAL 2 TIMES DAILY WITH MEALS
Qty: 60 TABLET | Refills: 3 | DISCHARGE
Start: 2020-10-09

## 2020-10-09 RX ORDER — OLANZAPINE 2.5 MG/1
5 TABLET ORAL 2 TIMES DAILY
Qty: 30 TABLET | Refills: 3 | DISCHARGE
Start: 2020-10-09

## 2020-10-09 RX ADMIN — LEVOTHYROXINE SODIUM 150 MCG: 150 TABLET ORAL at 09:52

## 2020-10-09 RX ADMIN — VENLAFAXINE 75 MG: 75 TABLET ORAL at 09:52

## 2020-10-09 RX ADMIN — WATER 1 G: 1 INJECTION INTRAMUSCULAR; INTRAVENOUS; SUBCUTANEOUS at 10:00

## 2020-10-09 RX ADMIN — OLANZAPINE 5 MG: 5 TABLET, FILM COATED ORAL at 09:52

## 2020-10-09 RX ADMIN — CARBAMAZEPINE 100 MG: 100 TABLET, CHEWABLE ORAL at 09:52

## 2020-10-09 RX ADMIN — CARVEDILOL 25 MG: 25 TABLET, FILM COATED ORAL at 09:52

## 2020-10-09 RX ADMIN — LAMOTRIGINE 50 MG: 25 TABLET ORAL at 09:52

## 2020-10-09 RX ADMIN — SODIUM CHLORIDE: 9 INJECTION, SOLUTION INTRAVENOUS at 06:46

## 2020-10-09 RX ADMIN — PANTOPRAZOLE SODIUM 40 MG: 40 TABLET, DELAYED RELEASE ORAL at 06:53

## 2020-10-09 RX ADMIN — ATORVASTATIN CALCIUM 20 MG: 20 TABLET, FILM COATED ORAL at 09:52

## 2020-10-09 RX ADMIN — TRAMADOL HYDROCHLORIDE 50 MG: 50 TABLET, FILM COATED ORAL at 09:51

## 2020-10-09 RX ADMIN — AMANTADINE HYDROCHLORIDE 100 MG: 100 CAPSULE ORAL at 09:52

## 2020-10-09 NOTE — PROGRESS NOTES
Physical Therapy  Facility/Department: [facility] AT THE [facility] MED SURG  Daily Treatment Note  NAME: Blake Roman  : 1950  MRN: [id number]    Date of Service: 10/9/2020    Discharge Recommendations:  ECF with PT, ECF without PT, Continue to assess pending progress        Assessment      PT Education: Goals;PT Role;Plan of Care;General Safety;Transfer Training  Patient Education: Educated pt on above with poor understanding noted. REQUIRES PT FOLLOW UP: Yes  Activity Tolerance  Activity Tolerance: Patient limited by fatigue;Patient limited by endurance     Patient Diagnosis(es): The encounter diagnosis was Acute renal insufficiency. has a past medical history of Bipolar disorder ([facility] [address] Cataracts, bilateral, Dementia ([facility] [address] Depression, Enthesopathy, Hyperlipidemia, Hypertension, Hypothyroidism, MRSA (methicillin resistant staph aureus) culture positive, Obesity, Osteoarthritis, Person living in residential institution, and Type 2 diabetes mellitus ([facility] [address] has a past surgical history that includes Hysterectomy. Restrictions  Restrictions/Precautions  Restrictions/Precautions: General Precautions, Fall Risk, Contact Precautions  Subjective   General  Chart Reviewed: Yes  Response To Previous Treatment: Patient with no complaints from previous session. Family / Caregiver Present: No  Referring Practitioner: MADAN Kelly CNP  Subjective  Subjective: Pt denies any pain this afternoon. Orientation  Orientation  Overall Orientation Status: Within Functional Limits  Cognition      Objective   Bed mobility  Rolling to Left: Maximum assistance  Rolling to Right: Maximum assistance  Sit to Supine: Maximum assistance;2 Person assistance  Scooting: Moderate assistance(To scoot to EOB.)  Comment: Max cues for safety/participation with poor to fair understanding noted. Transfers  Sit to Stand:  Moderate Assistance;Maximum Assistance;2 Person Assistance  Stand to sit: Moderate Assistance;Maximum Assistance;2 Person Assistance  Stand Pivot Transfers: Maximum Assistance;2 Person Assistance  Comment: Verbal cues needed for technique with poor understanding noted. Ambulation  Ambulation?: No     Balance  Posture: Poor  Sitting - Static: Fair;+  Sitting - Dynamic: Fair  Standing - Static: Poor  Standing - Dynamic: Poor;-  Exercises  Straight Leg Raise: 20x  Heelslides: 20x  Hip Flexion: 20x  Hip Abduction: 20x  Knee Long Arc Quad: 20x  Knee Short Arc Quad: 20x  Ankle Pumps: 20x  Comments: Above exercises completed while seated/reclined in chair. AAROM as needed d/t fatigue/weakness. Max verbal/tactile cues for technique. G-Code     OutComes Score    AM-PAC Score    Goals  Short term goals  Time Frame for Short term goals: 10 days  Short term goal 1: Patient will perform stand pivot transfer with CGA  Short term goal 2: Patient will perform bed mobility with SBA  Short term goal 3: Patient will tolerate 20-30 minutes of therex/act to improve endurance for ADLs. Patient Goals   Patient goals : Improve mobility    Plan    Plan  Times per week: 7  Times per day: Twice a day  Plan Comment: 1x/day on weekends  Safety Devices  Type of devices:  All fall risk precautions in place, Call light within reach, Nurse notified, Gait belt, Patient at risk for falls, Bed alarm in place, Left in bed     Therapy Time   Individual Concurrent Group Co-treatment   Time In 1259         Time Out 1339         Minutes [place], [place]

## 2020-10-09 NOTE — DISCHARGE SUMMARY
Discharge Summary    Tequila Maharaj  :  1950  MRN:  [id number]    Admit date:  10/7/2020      Discharge date: 10/9/2020     Admitting Physician:  Mary Jo Ramos MD    Discharge Diagnoses: Active Problems:    Type 2 diabetes mellitus without complication, without long-term current use of insulin (HCC)    Acute kidney injury ([facility] [address]  Resolved Problems:    * No resolved hospital problems. *      Hospital Course:   Tequila Maharaj is a 65 y.o. female admitted with weakness. She presented to the ER yesterday from [facility] due to increased weakness. They reported this was constant since onset. They reported generalized weakness. The patient denied pain. She denied urinary dyruia or frequency. She denied CP or palpitations. She denied SOB or cough. She is non-ambulatory at the NH and uses a wheelchair. Her Creatinine was 3.34 on admission and improved to 2.34 today. Her baseline is 1.15-1.66. She is voiding several times but is incontinent. She is afebrile. I will discharge her today back to [facility]. She will have a BMP in 3 weeks. I will also send her on 5 more days of ATB for UTI due to complaints of dysuria and EColi present in Culture. Consultants:  none    Procedures: none    Complications: none    Discharge Condition: stable    Exam:  GEN:  alert and oriented to person, place and time, well-developed and well-nourished, in no acute distress  EYES: No gross abnormalities. , PERRL and EOMI  NECK: normal, supple, no lymphadenopathy,  no carotid bruits  PULM: clear to auscultation bilaterally- no wheezes, rales or rhonchi, normal air movement, no respiratory distress  COR: regular rate & rhythm, no murmurs, no gallops, S1 normal and S2 normal  ABD:  soft, non-tender, non-distended, normal bowel sounds, no masses or organomegaly  EXT:   no cyanosis, clubbing or edema present    NEURO: follows commands, DAVIS, no deficits  SKIN:  no rashes or significant lesions      Significant Diagnostic Studies:   Lab Results   Component Value Date    WBC 5.3 10/07/2020    HGB 11.4 (L) 10/07/2020     10/07/2020       Lab Results   Component Value Date    BUN 39 (H) 10/09/2020    CREATININE 2.34 (H) 10/09/2020     10/09/2020    K 4.2 10/09/2020    CALCIUM 9.2 10/09/2020     10/09/2020    CO2 19 (L) 10/09/2020    LABGLOM 20 (L) 10/09/2020       Lab Results   Component Value Date    WBCUA 2 TO 5 10/06/2020    RBCUA 0 TO 2 10/06/2020    EPITHUA 2 TO 5 10/06/2020    LEUKOCYTESUR TRACE (A) 10/06/2020    SPECGRAV >1.030 (H) 10/06/2020    GLUCOSEU NEGATIVE 10/06/2020    KETUA TRACE (A) 10/06/2020    PROTEINU NEGATIVE 10/06/2020    HGBUR NEGATIVE 10/06/2020    CASTUA 2 TO 5 HYALINE 10/06/2020    CRYSTUA NOT REPORTED 10/06/2020    BACTERIA 1+ (A) 10/06/2020    YEAST NOT REPORTED 10/06/2020       Xr Chest Portable    Result Date: 10/7/2020  EXAMINATION: ONE XRAY VIEW OF THE CHEST 10/7/2020 2:39 pm COMPARISON: Chest September 3, 2017. HISTORY: ORDERING SYSTEM PROVIDED HISTORY: weak TECHNOLOGIST PROVIDED HISTORY: weak FINDINGS: Heart appears normal in size. Evidence of old granulomas disease. Mild left basilar atelectasis. No focal consolidation, pneumothorax, large pleural effusion or free air. Mild left basilar atelectasis. No focal consolidation is seen to suggest pneumonia.        Assessment and Plan:  Patient Active Problem List    Diagnosis Date Noted    Acute kidney injury ([address] 10/07/2020    Primary osteoarthritis of both knees     Morbid obesity ([address] 03/27/2017    Bipolar disorder ([address]     Hypothyroidism     Type 2 diabetes mellitus without complication, without long-term current use of insulin ([address]     Essential hypertension, benign 08/31/2015    Person living in residential institution         Discharge Medications:       Laureano Jamesates   Home Medication Instructions COH:[id number]    Printed on:10/09/20 0687   Medication Information                      acetaminophen (TYLENOL) 325 MG tablet  Take 650 mg by mouth 2 times daily             acetaminophen (TYLENOL) 325 MG tablet  Take 650 mg by mouth every 4 hours as needed for Pain             Amantadine (SYMMETREL) 100 MG TABS tablet  Take 100 mg by mouth 2 times daily             amLODIPine (NORVASC) 5 MG tablet  Take 5 mg by mouth daily             atorvastatin (LIPITOR) 20 MG tablet  Take 20 mg by mouth daily             bisacodyl (DULCOLAX) 10 MG suppository  Place 10 mg rectally daily as needed for Constipation             calcium-vitamin D (OSCAL-500) 500-200 MG-UNIT per tablet  Take 1 tablet by mouth daily              carBAMazepine (TEGRETOL) 100 MG chewable tablet  Take 100 mg by mouth daily              carvedilol (COREG) 25 MG tablet  Take 1 tablet by mouth 2 times daily (with meals)             cephALEXin (KEFLEX) 250 MG capsule  Take 1 capsule by mouth 2 times daily for 5 days             dextromethorphan (DELSYM) 30 MG/5ML extended release liquid  Take 30 mg by mouth 2 times daily as needed for Cough              dextromethorphan-guaiFENesin (MUCINEX DM)  MG per extended release tablet  Take 1 tablet by mouth every 12 hours as needed             diclofenac sodium 1 % GEL  Apply 2 g topically three times daily             diphenhydrAMINE (BENADRYL) 25 MG tablet  Take 25 mg by mouth nightly as needed for Itching             Dulaglutide (TRULICITY) 2.45 SY/1.6EM SOPN  Inject 0.75 mg into the skin once a week Every monday             ferrous sulfate (FE TABS) 325 (65 FE) MG EC tablet  Take 1 tablet by mouth 2 times daily             hydrocerin (EUCERIN) lotion  2 times daily for dry skin             HYDROcodone-acetaminophen (NORCO) 5-325 MG per tablet  Take 1 tablet by mouth every 6 hours as needed for Pain .             hydrOXYzine (ATARAX) 10 MG tablet  Take 10 mg by mouth 3 times daily as needed for Itching             Lactobacillus (PROBIOTIC ACIDOPHILUS) TABS  Take 1 tablet by mouth daily             lamoTRIgine (LAMICTAL)

## 2020-10-09 NOTE — PROGRESS NOTES
Writer entered room to check on patient and noted brief was wet. While trying to change patient, pt became agitated and told writer \"Stop! Just stop it, you keep doing this. Just let me be wet and go away. \" Writer reminded patient on importance of changing brief as soon as possible to prevent skin breakdown. Patient continued to be agitated but writer did get brief changed. Will monitor.

## 2020-10-09 NOTE — PLAN OF CARE
Problem: Falls - Risk of:  Goal: Will remain free from falls  Description: Will remain free from falls  10/9/2020 0819 by Hector Blunt RN  Outcome: Ongoing     Problem: Falls - Risk of:  Goal: Absence of physical injury  Description: Absence of physical injury  Outcome: Ongoing     Problem: Skin Integrity:  Goal: Will show no infection signs and symptoms  Description: Will show no infection signs and symptoms  Outcome: Ongoing     Problem: Skin Integrity:  Goal: Absence of new skin breakdown  Description: Absence of new skin breakdown  10/9/2020 0819 by Hector Blunt RN  Outcome: Ongoing     Problem: Musculor/Skeletal Functional Status  Goal: Highest potential functional level  Outcome: Ongoing     Problem: Musculor/Skeletal Functional Status  Goal: Absence of falls  Outcome: Ongoing

## 2020-10-09 NOTE — PROGRESS NOTES
[address] Rodney Barnett attempted to be called 3 separate times to call a report. On the third time, an aid answered and was not able to find a nurse at present time. Phone number left for them to sp back when they are ready to receive report.

## 2020-10-09 NOTE — PROGRESS NOTES
Writer attempted to administer scheduled Lovenox. Writer notified patient that she has a poke in the belly to give. Pt responds with \"no in my knees, that's where it goes. \" writer educated patient on proper placement of Lovenox injections and patient continues to argue with writer stating it goes in her knees. Pt then becomes agitated yelling out \"ASK MY DOCTOR\".  Lovenox not given at this time due to patient refusal.

## 2020-10-09 NOTE — PROGRESS NOTES
Progress Note    SUBJECTIVE:  F/u to weakness    OBJECTIVE:  Sitting up in chair with OT doing exercises. No complaints this morning. Afebrile. Tolerating diet well.  2 assist for transfers.       Vitals:   Vitals:    10/09/20 0627   BP: 100/70   Pulse: 72   Resp: 18   Temp: 98.6 °F (37 °C)   SpO2: 91%     Weight: 231 lb 11.2 oz (105.1 kg)   Height: 5' 4\" (162.6 cm)   -----------------------------------------------------------------  Exam:    CONSTITUTIONAL:  awake, alert, cooperative, no apparent distress, and appears stated age  EYES:  Lids and lashes normal, pupils equal, round and reactive to light, extra ocular muscles intact, sclera clear, conjunctiva normal  ENT:  normocepalic, without obvious abnormality, atraumatic  NECK:  supple, symmetrical, trachea midline, skin normal and no stridor  HEMATOLOGIC/LYMPHATICS:  no cervical lymphadenopathy and no supraclavicular lymphadenopathy  LUNGS:  No increased work of breathing, good air exchange, clear to auscultation bilaterally, no crackles or wheezing  CARDIOVASCULAR:  Normal apical impulse, regular rate and rhythm, normal S1 and S2, no S3 or S4, and no murmur noted  ABDOMEN:  No scars, normal bowel sounds, soft, non-distended, non-tender, no masses palpated, no hepatosplenomegally  MUSCULOSKELETAL:  there is no redness, warmth, or swelling of the joints  full range of motion noted  motor strength is 5 out of 5 all extremities bilaterally  tone is normal  NEUROLOGIC:  Mental Status Exam:  Level of Alertness:   awake  Orientation:   person, place, time  Memory:   normal  SKIN:  no bruising or bleeding, normal skin color, texture, turgor, no redness, warmth, or swelling, no rashes and no lesions  EXT:     no cyanosis, clubbing or edema present    -----------------------------------------------------------------  Diagnostic Data: Reviewed    ASSESSMENT:   Active Problems:    Type 2 diabetes mellitus without complication, without long-term current use of insulin

## 2020-10-09 NOTE — DISCHARGE INSTR - DIET

## 2020-10-09 NOTE — PROGRESS NOTES
Occupational Therapy  Facility/Department: [facility] AT THE [facility] MED SURG  Daily Treatment Note  NAME: Cy Shelton  : 1950  MRN: [id number]    Date of Service: 10/9/2020    Discharge Recommendations:  Subacute/Skilled Nursing Facility       Assessment      OT Education: Transfer Training  Patient Education: safe transfer techniques  Activity Tolerance  Activity Tolerance: Patient limited by fatigue  Safety Devices  Type of devices: Call light within reach; Chair alarm in place; Left in chair         Patient Diagnosis(es): The encounter diagnosis was Acute renal insufficiency. has a past medical history of Bipolar disorder ([facility] [address] Cataracts, bilateral, Dementia ([facility] [address] Depression, Enthesopathy, Hyperlipidemia, Hypertension, Hypothyroidism, MRSA (methicillin resistant staph aureus) culture positive, Obesity, Osteoarthritis, Person living in residential institution, and Type 2 diabetes mellitus ([facility] [address] has a past surgical history that includes Hysterectomy. Restrictions  Restrictions/Precautions  Restrictions/Precautions: General Precautions, Fall Risk, Contact Precautions  Subjective   General  Chart Reviewed: Yes  Patient assessed for rehabilitation services?: Yes  Family / Caregiver Present: No  Referring Practitioner: Esha Benson CNP  Diagnosis: acute kidney injury  Subjective  Subjective: Pt reports no pain this date. General Comment  Comments: Pt sitting at EOB with PTA on therapist arrival. Completed transer to bedside chair with assist x2, agreeable to OT Tx. Orientation     Objective    ADL  Additional Comments: Pt completed UB bathing while seated in bedside chair. Max VC and mod-maxA provided for appropriate completion, as pt went through bathing tasks very quickly. Transfers  Sit to stand:  Moderate assistance;2 Person assistance  Stand to sit: Moderate assistance;2 Person assistance  Transfer Comments: verbal prompts for safe transfer techniques              Type of ROM/Therapeutic Exercise  Type of ROM/Therapeutic Exercise: Free weights  Comment: Pt completed BUE therex using 1# dumbbell this date to improve UB strength for increased ease and independence with ADL and functional transfers. Pt completed 3 exercises x2 sets x10-20 reps each. Required increased cueing and physical assistance for completion, with rest breaks between, but tolerated well overall. Plan   Plan  Times per week: 7  Times per day: Daily  Current Treatment Recommendations: Strengthening, Patient/Caregiver Education & Training, Self-Care / ADL, Balance Training, Functional Mobility Training, Safety Education & Training  Plan Comment: ther ex, ther act, ADL training    Goals  Short term goals  Time Frame for Short term goals: 10 visits  Short term goal 1: Pt will perform functional transfer/mobility using LRAD with min A to increase independence with ADLs. Short term goal 2: Pt will tolerate 15 minutes or greater ther act/ther ex without intolerable SOB to increase functional activity tolerance. Short term goal 3: Pt will perform LB ADLs using AE as needed with min A for increased independence with ADLs. Short term goal 4: Pt will complete grooming tasks while seated with SBA for dynamic seated balance.        Therapy Time   Individual Concurrent Group Co-treatment   Time In [address] [place] [address] Court         Time Out 0948         Minutes Chase Jean [address] [place] [address]

## 2020-10-09 NOTE — PROGRESS NOTES
Initial shift assessment done and VS obtained as charted in flow sheet. Pt is oriented to self only. Pt is sitting up in chair watching television. Current /44, will reassess within two hours. Pt denies any needs at this time. Alarm in place, call light within reach.

## 2020-10-09 NOTE — PROGRESS NOTES
[facility] Department of Pharmacy   Pharmacy Renal Adjustment Note    Lady Beach is a 65 y.o. female. Pharmacist assessment of renally cleared medications. Recent Labs     10/07/20  1414 10/08/20  0545 10/09/20  0545   CREATININE 2.63* 2.29* 2.34*     Estimated Creatinine Clearance: 26 mL/min (A) (based on SCr of 2.34 mg/dL (H)).     Height:   Ht Readings from Last 1 Encounters:   10/08/20 5' 4\" (1.626 m)     Weight:  Wt Readings from Last 1 Encounters:   10/09/20 231 lb 11.2 oz (105.1 kg)       The following medication has been adjusted based upon renal function:             Lovenox 40 mg SQ daily to Lovenox 30 mg SQ daily for CrCl <30 mL/min        Isabel Delarosa,10/9/2020,7:52 AM

## 2020-10-09 NOTE — PROGRESS NOTES
Physical Therapy  Facility/Department: [facility] AT THE [facility] MED SURG  Daily Treatment Note  NAME: Maurice Choi  : 1950  MRN: [id number]    Date of Service: 10/9/2020    Discharge Recommendations:  ECF with PT, ECF without PT, Continue to assess pending progress        Assessment      PT Education: Goals;PT Role;Plan of Care;General Safety;Transfer Training  Patient Education: Educated pt on above with fair understanding noted. REQUIRES PT FOLLOW UP: Yes  Activity Tolerance  Activity Tolerance: Patient limited by fatigue;Patient limited by endurance     Patient Diagnosis(es): The encounter diagnosis was Acute renal insufficiency. has a past medical history of Bipolar disorder ([facility] [address] Cataracts, bilateral, Dementia ([facility] [address] Depression, Enthesopathy, Hyperlipidemia, Hypertension, Hypothyroidism, MRSA (methicillin resistant staph aureus) culture positive, Obesity, Osteoarthritis, Person living in residential institution, and Type 2 diabetes mellitus ([facility] [address] has a past surgical history that includes Hysterectomy. Restrictions  Restrictions/Precautions  Restrictions/Precautions: General Precautions, Fall Risk, Contact Precautions  Subjective   General  Chart Reviewed: Yes  Response To Previous Treatment: Patient with no complaints from previous session. Family / Caregiver Present: No  Referring Practitioner: MADAN Walker CNP  Subjective  Subjective: Pt reported generalized pain in bilat LE but unable to describe or verbalize a number. Orientation  Orientation  Overall Orientation Status: Within Functional Limits  Cognition      Objective   Bed mobility  Supine to Sit: Moderate assistance;Maximum assistance  Scooting: Moderate assistance(To scoot to EOB.)  Transfers  Sit to Stand: Minimal Assistance; Moderate Assistance;2 Person Assistance  Stand to sit: Minimal Assistance; Moderate Assistance;2 Person Assistance  Stand Pivot Transfers: Minimal Assistance;2 Person Assistance  Comment: Verbal cues needed for technique with poor to fair understanding noted. Ambulation  Ambulation?: No     Balance  Posture: Poor  Sitting - Static: Fair;+  Sitting - Dynamic: Fair  Standing - Static: Poor  Standing - Dynamic: Poor  Exercises  Straight Leg Raise: 15x  Quad Sets: 15x  Heelslides: 15x  Hip Flexion: 15x  Hip Abduction: 15x  Knee Short Arc Quad: 15x  Ankle Pumps: 15x  Comments: Above exercises completed in supine. AAROM as needed d/t fatigue/weakness. Max verbal/tactile cues for technique. G-Code     OutComes Score    AM-PAC Score    Goals  Short term goals  Time Frame for Short term goals: 10 days  Short term goal 1: Patient will perform stand pivot transfer with CGA  Short term goal 2: Patient will perform bed mobility with SBA  Short term goal 3: Patient will tolerate 20-30 minutes of therex/act to improve endurance for ADLs. Patient Goals   Patient goals : Improve mobility    Plan    Plan  Times per week: 7  Times per day: Twice a day  Plan Comment: 1x/day on weekends  Safety Devices  Type of devices:  All fall risk precautions in place, Left in chair, Call light within reach, Nurse notified, Gait belt, Patient at risk for falls(MAURA Henry working with pt upon conclusion of treatment.)     Therapy Time   Individual Concurrent Group Co-treatment   Time In 0900         Time Out 0924         Minutes [place], [place]

## 2020-10-09 NOTE — PROGRESS NOTES
Patient is discharge back to [facility]. Discharge paper work done and fax to [facility]. Unable to get a hold of family do to no mail box set up or phone is disconnect. The swk at [facility] will try to get a hold of the family.     SHAHEEN Alexandra

## 2020-10-09 NOTE — PROGRESS NOTES
Patient to ER entrance with transport for discharge back to [facility].

## 2020-10-12 ENCOUNTER — HOSPITAL ENCOUNTER (OUTPATIENT)
Age: 70
Setting detail: SPECIMEN
Discharge: HOME | End: 2020-10-12
Payer: MEDICARE

## 2020-10-12 NOTE — PROGRESS NOTES
Physician Progress Note      PATIENT:               Liliana Ho  CSN #:                  [id number]  :                       1950  ADMIT DATE:       10/7/2020 1:19 PM  [address] DATE:        10/9/2020 4:51 PM  RESPONDING  PROVIDER #:        Ricarda Mejia MD          QUERY TEXT:    Pt admitted with TREE and UTI. Per H&P disoriented. If possible, please document in the progress notes and discharge summary if   you are evaluating and / or treating any of the following: The medical record reflects the following:  Risk Factors: TREE  UTI, bipolar disorder  Clinical Indicators: NH reported weakness. Pt States \"I don't know why I am   here\"; Per H&P: alert, disoriented, per NP 10/9 progress note: alert,   orientation: time place, person  Treatment: IV fluid bolus and infusion, IV Rocephin    Cintia Beyer RN, [address] [place] [address]   [phone]  . Options provided:  -- Metabolic encephalopathy  -- Toxic encephalopathy  -- Encephalopathy due to medications or drugs, Please specify  -- Toxic metabolic encephalopathy  -- Delirium, Please specify cause  -- Other - I will add my own diagnosis  -- Disagree - Not applicable / Not valid  -- Disagree - Clinically unable to determine / Unknown  -- Refer to Clinical Documentation Reviewer    PROVIDER RESPONSE TEXT:    This patient has metabolic encephalopathy.     Query created by: Neil Castillo on 10/9/2020 10:13 AM      Electronically signed by:  Ricarda Mejia MD 10/12/2020 8:05 AM

## 2020-10-20 ENCOUNTER — HOSPITAL ENCOUNTER (OUTPATIENT)
Age: 70
Setting detail: SPECIMEN
Discharge: HOME | End: 2020-10-20
Payer: MEDICARE

## 2020-10-21 ENCOUNTER — HOSPITAL ENCOUNTER (OUTPATIENT)
Age: 70
Setting detail: SPECIMEN
Discharge: HOME | End: 2020-10-21
Payer: MEDICARE

## 2020-10-30 ENCOUNTER — HOSPITAL ENCOUNTER (OUTPATIENT)
Age: 70
Setting detail: SPECIMEN
Discharge: HOME | End: 2020-10-30
Payer: COMMERCIAL

## 2020-10-30 LAB
ANION GAP SERPL CALCULATED.3IONS-SCNC: 14 MMOL/L (ref 9–17)
BUN BLDV-MCNC: 48 MG/DL (ref 8–23)
BUN/CREAT BLD: 23 (ref 9–20)
CALCIUM SERPL-MCNC: 10.3 MG/DL (ref 8.6–10.4)
CHLORIDE BLD-SCNC: 102 MMOL/L (ref 98–107)
CO2: 22 MMOL/L (ref 20–31)
CREAT SERPL-MCNC: 2.11 MG/DL (ref 0.5–0.9)
GFR AFRICAN AMERICAN: 28 ML/MIN
GFR NON-AFRICAN AMERICAN: 23 ML/MIN
GFR SERPL CREATININE-BSD FRML MDRD: ABNORMAL ML/MIN/{1.73_M2}
GFR SERPL CREATININE-BSD FRML MDRD: ABNORMAL ML/MIN/{1.73_M2}
GLUCOSE BLD-MCNC: 102 MG/DL (ref 70–99)
POTASSIUM SERPL-SCNC: 4.1 MMOL/L (ref 3.7–5.3)
SODIUM BLD-SCNC: 138 MMOL/L (ref 135–144)

## 2020-10-30 PROCEDURE — 84443 ASSAY THYROID STIM HORMONE: CPT

## 2020-10-30 PROCEDURE — 80048 BASIC METABOLIC PNL TOTAL CA: CPT

## 2020-11-01 LAB — TSH SERPL DL<=0.05 MIU/L-ACNC: 0.33 MIU/L (ref 0.3–5)

## 2020-11-19 ENCOUNTER — HOSPITAL ENCOUNTER (OUTPATIENT)
Age: 70
Setting detail: SPECIMEN
Discharge: HOME | End: 2020-11-19
Payer: MEDICARE

## 2020-11-19 LAB
ABSOLUTE EOS #: 0.23 K/UL (ref 0–0.44)
ABSOLUTE IMMATURE GRANULOCYTE: 0.1 K/UL (ref 0–0.3)
ABSOLUTE LYMPH #: 1.59 K/UL (ref 1.1–3.7)
ABSOLUTE MONO #: 1.16 K/UL (ref 0.1–1.2)
ALBUMIN SERPL-MCNC: 3.6 G/DL (ref 3.5–5.2)
ALBUMIN/GLOBULIN RATIO: 0.9 (ref 1–2.5)
ALP BLD-CCNC: 74 U/L (ref 35–104)
ALT SERPL-CCNC: 10 U/L (ref 5–33)
ANION GAP SERPL CALCULATED.3IONS-SCNC: 11 MMOL/L (ref 9–17)
AST SERPL-CCNC: 16 U/L
BASOPHILS # BLD: 0 % (ref 0–2)
BASOPHILS ABSOLUTE: <0.03 K/UL (ref 0–0.2)
BILIRUB SERPL-MCNC: 0.37 MG/DL (ref 0.3–1.2)
BUN BLDV-MCNC: 41 MG/DL (ref 8–23)
BUN/CREAT BLD: 22 (ref 9–20)
CALCIUM SERPL-MCNC: 10.1 MG/DL (ref 8.6–10.4)
CHLORIDE BLD-SCNC: 104 MMOL/L (ref 98–107)
CO2: 26 MMOL/L (ref 20–31)
CREAT SERPL-MCNC: 1.89 MG/DL (ref 0.5–0.9)
DIFFERENTIAL TYPE: ABNORMAL
EOSINOPHILS RELATIVE PERCENT: 4 % (ref 1–4)
GFR AFRICAN AMERICAN: 32 ML/MIN
GFR NON-AFRICAN AMERICAN: 26 ML/MIN
GFR SERPL CREATININE-BSD FRML MDRD: ABNORMAL ML/MIN/{1.73_M2}
GFR SERPL CREATININE-BSD FRML MDRD: ABNORMAL ML/MIN/{1.73_M2}
GLUCOSE BLD-MCNC: 80 MG/DL (ref 70–99)
HCT VFR BLD CALC: 37.4 % (ref 36.3–47.1)
HEMOGLOBIN: 12.2 G/DL (ref 11.9–15.1)
IMMATURE GRANULOCYTES: 2 %
LYMPHOCYTES # BLD: 28 % (ref 24–43)
MCH RBC QN AUTO: 31.9 PG (ref 25.2–33.5)
MCHC RBC AUTO-ENTMCNC: 32.6 G/DL (ref 28.4–34.8)
MCV RBC AUTO: 97.9 FL (ref 82.6–102.9)
MONOCYTES # BLD: 21 % (ref 3–12)
NRBC AUTOMATED: 0 PER 100 WBC
PDW BLD-RTO: 13 % (ref 11.8–14.4)
PLATELET # BLD: 280 K/UL (ref 138–453)
PLATELET ESTIMATE: ABNORMAL
PMV BLD AUTO: 11.8 FL (ref 8.1–13.5)
POTASSIUM SERPL-SCNC: 4 MMOL/L (ref 3.7–5.3)
RBC # BLD: 3.82 M/UL (ref 3.95–5.11)
RBC # BLD: ABNORMAL 10*6/UL
SEG NEUTROPHILS: 45 % (ref 36–65)
SEGMENTED NEUTROPHILS ABSOLUTE COUNT: 2.51 K/UL (ref 1.5–8.1)
SODIUM BLD-SCNC: 141 MMOL/L (ref 135–144)
TOTAL PROTEIN: 7.7 G/DL (ref 6.4–8.3)
WBC # BLD: 5.6 K/UL (ref 3.5–11.3)
WBC # BLD: ABNORMAL 10*3/UL

## 2020-11-19 PROCEDURE — 85025 COMPLETE CBC W/AUTO DIFF WBC: CPT

## 2020-11-19 PROCEDURE — 36415 COLL VENOUS BLD VENIPUNCTURE: CPT

## 2020-11-19 PROCEDURE — P9603 ONE-WAY ALLOW PRORATED MILES: HCPCS

## 2020-11-19 PROCEDURE — 80053 COMPREHEN METABOLIC PANEL: CPT

## 2021-02-01 ENCOUNTER — HOSPITAL ENCOUNTER (OUTPATIENT)
Age: 71
Setting detail: SPECIMEN
Discharge: HOME | End: 2021-02-01
Payer: MEDICARE

## 2021-02-01 LAB — TRIGL SERPL-MCNC: 148 MG/DL

## 2021-02-01 PROCEDURE — 84478 ASSAY OF TRIGLYCERIDES: CPT

## 2021-02-02 ENCOUNTER — HOSPITAL ENCOUNTER (OUTPATIENT)
Age: 71
Setting detail: SPECIMEN
Discharge: HOME | End: 2021-02-02
Payer: MEDICARE

## 2021-02-11 ENCOUNTER — HOSPITAL ENCOUNTER (OUTPATIENT)
Age: 71
Setting detail: SPECIMEN
Discharge: HOME | End: 2021-02-11
Payer: MEDICARE

## 2021-04-19 PROBLEM — F31.9 AFFECTIVE PSYCHOSIS, BIPOLAR (HCC): Status: ACTIVE | Noted: 2021-04-19

## 2021-07-26 ENCOUNTER — HOSPITAL ENCOUNTER (OUTPATIENT)
Age: 71
Setting detail: SPECIMEN
Discharge: HOME | End: 2021-07-26
Payer: MEDICARE

## 2021-07-26 LAB
-: ABNORMAL
AMORPHOUS: ABNORMAL
BACTERIA: ABNORMAL
BILIRUBIN URINE: NEGATIVE
CASTS UA: ABNORMAL /LPF
COLOR: YELLOW
COMMENT UA: ABNORMAL
CRYSTALS, UA: ABNORMAL /HPF
EPITHELIAL CELLS UA: ABNORMAL /HPF (ref 0–25)
GLUCOSE URINE: NEGATIVE
KETONES, URINE: NEGATIVE
LEUKOCYTE ESTERASE, URINE: ABNORMAL
MUCUS: ABNORMAL
NITRITE, URINE: POSITIVE
OTHER OBSERVATIONS UA: ABNORMAL
PH UA: 7 (ref 5–9)
PROTEIN UA: ABNORMAL
RBC UA: ABNORMAL /HPF (ref 0–2)
RENAL EPITHELIAL, UA: ABNORMAL /HPF
SPECIFIC GRAVITY UA: 1.02 (ref 1.01–1.02)
TRICHOMONAS: ABNORMAL
TURBIDITY: ABNORMAL
URINE HGB: ABNORMAL
UROBILINOGEN, URINE: NORMAL
WBC UA: ABNORMAL /HPF (ref 0–5)
YEAST: ABNORMAL

## 2021-07-26 PROCEDURE — 87088 URINE BACTERIA CULTURE: CPT

## 2021-07-26 PROCEDURE — 81001 URINALYSIS AUTO W/SCOPE: CPT

## 2021-07-26 PROCEDURE — 87086 URINE CULTURE/COLONY COUNT: CPT

## 2021-07-26 PROCEDURE — 87186 SC STD MICRODIL/AGAR DIL: CPT

## 2021-07-28 LAB
CULTURE: ABNORMAL
Lab: ABNORMAL
SPECIMEN DESCRIPTION: ABNORMAL

## 2021-07-29 ENCOUNTER — TELEPHONE (OUTPATIENT)
Dept: PRIMARY CARE CLINIC | Age: 71
End: 2021-07-29

## 2021-07-29 NOTE — TELEPHONE ENCOUNTER
Queenie from [facility] called requesting that you review her urine culture and order antibiotics please, states she she is symptomatic and having behaviors. Dr. Mary Jane Bhatt is out of the office and she was told by his staff that they could not guarantee that it would be addressed by Ruddy Willett or Mary Patrick today. She has an allergy to Bactrim.

## 2022-02-21 ENCOUNTER — HOSPITAL ENCOUNTER (OUTPATIENT)
Age: 72
Setting detail: SPECIMEN
Discharge: HOME | End: 2022-02-21
Payer: MEDICARE

## 2022-02-21 DIAGNOSIS — E11.9 TYPE 2 DIABETES MELLITUS WITHOUT COMPLICATION, WITHOUT LONG-TERM CURRENT USE OF INSULIN (HCC): ICD-10-CM

## 2022-02-21 DIAGNOSIS — E03.9 HYPOTHYROIDISM, UNSPECIFIED TYPE: ICD-10-CM

## 2022-02-21 LAB
ANION GAP SERPL CALCULATED.3IONS-SCNC: 12 MMOL/L (ref 9–17)
BUN BLDV-MCNC: 17 MG/DL (ref 8–23)
BUN/CREAT BLD: 17 (ref 9–20)
CALCIUM SERPL-MCNC: 10 MG/DL (ref 8.6–10.4)
CHLORIDE BLD-SCNC: 103 MMOL/L (ref 98–107)
CHOLESTEROL/HDL RATIO: 3.8
CHOLESTEROL: 170 MG/DL
CO2: 27 MMOL/L (ref 20–31)
CREAT SERPL-MCNC: 0.99 MG/DL (ref 0.5–0.9)
GFR AFRICAN AMERICAN: >60 ML/MIN
GFR NON-AFRICAN AMERICAN: 55 ML/MIN
GFR SERPL CREATININE-BSD FRML MDRD: ABNORMAL ML/MIN/{1.73_M2}
GFR SERPL CREATININE-BSD FRML MDRD: ABNORMAL ML/MIN/{1.73_M2}
GLUCOSE BLD-MCNC: 80 MG/DL (ref 70–99)
HDLC SERPL-MCNC: 45 MG/DL
LDL CHOLESTEROL: 110 MG/DL (ref 0–130)
POTASSIUM SERPL-SCNC: 4.3 MMOL/L (ref 3.7–5.3)
SODIUM BLD-SCNC: 142 MMOL/L (ref 135–144)
TRIGL SERPL-MCNC: 75 MG/DL

## 2022-02-21 PROCEDURE — 80048 BASIC METABOLIC PNL TOTAL CA: CPT

## 2022-02-21 PROCEDURE — 36415 COLL VENOUS BLD VENIPUNCTURE: CPT

## 2022-02-21 PROCEDURE — 80061 LIPID PANEL: CPT

## 2022-02-21 PROCEDURE — P9603 ONE-WAY ALLOW PRORATED MILES: HCPCS

## 2022-03-09 ENCOUNTER — HOSPITAL ENCOUNTER (OUTPATIENT)
Age: 72
Setting detail: SPECIMEN
Discharge: HOME | End: 2022-03-09
Payer: MEDICARE

## 2022-03-09 LAB
-: ABNORMAL
BACTERIA: ABNORMAL
BILIRUBIN URINE: NEGATIVE
COLOR: YELLOW
EPITHELIAL CELLS UA: ABNORMAL /HPF (ref 0–25)
GLUCOSE URINE: NEGATIVE
KETONES, URINE: NEGATIVE
LEUKOCYTE ESTERASE, URINE: ABNORMAL
NITRITE, URINE: POSITIVE
PH UA: 5.5 (ref 5–9)
PROTEIN UA: NEGATIVE
RBC UA: ABNORMAL /HPF (ref 0–2)
SPECIFIC GRAVITY UA: >1.03 (ref 1.01–1.02)
TURBIDITY: ABNORMAL
URINE HGB: ABNORMAL
UROBILINOGEN, URINE: NORMAL
WBC UA: ABNORMAL /HPF (ref 0–5)

## 2022-03-09 PROCEDURE — 87088 URINE BACTERIA CULTURE: CPT

## 2022-03-09 PROCEDURE — 87086 URINE CULTURE/COLONY COUNT: CPT

## 2022-03-09 PROCEDURE — 81001 URINALYSIS AUTO W/SCOPE: CPT

## 2022-03-09 PROCEDURE — 87186 SC STD MICRODIL/AGAR DIL: CPT

## 2022-03-11 LAB
CULTURE: ABNORMAL
SPECIMEN DESCRIPTION: ABNORMAL

## 2022-05-11 ENCOUNTER — HOSPITAL ENCOUNTER (OUTPATIENT)
Age: 72
Setting detail: SPECIMEN
Discharge: HOME | End: 2022-05-11
Payer: MEDICARE

## 2022-05-11 LAB
-: ABNORMAL
BACTERIA: ABNORMAL
BILIRUBIN URINE: NEGATIVE
COLOR: YELLOW
EPITHELIAL CELLS UA: ABNORMAL /HPF (ref 0–25)
GLUCOSE URINE: NEGATIVE
KETONES, URINE: NEGATIVE
LEUKOCYTE ESTERASE, URINE: ABNORMAL
NITRITE, URINE: POSITIVE
PH UA: 6 (ref 5–9)
PROTEIN UA: ABNORMAL
RBC UA: ABNORMAL /HPF (ref 0–2)
SPECIFIC GRAVITY UA: >1.03 (ref 1.01–1.02)
TURBIDITY: ABNORMAL
URINE HGB: ABNORMAL
UROBILINOGEN, URINE: NORMAL
WBC UA: ABNORMAL /HPF (ref 0–5)

## 2022-05-11 PROCEDURE — 81001 URINALYSIS AUTO W/SCOPE: CPT

## 2022-05-11 PROCEDURE — 87088 URINE BACTERIA CULTURE: CPT

## 2022-05-11 PROCEDURE — 87086 URINE CULTURE/COLONY COUNT: CPT

## 2022-05-11 PROCEDURE — 87186 SC STD MICRODIL/AGAR DIL: CPT

## 2022-05-13 LAB
CULTURE: ABNORMAL
SPECIMEN DESCRIPTION: ABNORMAL

## 2022-05-28 ENCOUNTER — HOSPITAL ENCOUNTER (OUTPATIENT)
Age: 72
Setting detail: SPECIMEN
Discharge: HOME | End: 2022-05-28
Payer: MEDICARE

## 2022-05-28 LAB
-: ABNORMAL
BACTERIA: ABNORMAL
BILIRUBIN URINE: NEGATIVE
COLOR: YELLOW
EPITHELIAL CELLS UA: ABNORMAL /HPF (ref 0–25)
GLUCOSE URINE: NEGATIVE
KETONES, URINE: NEGATIVE
LEUKOCYTE ESTERASE, URINE: ABNORMAL
MUCUS: ABNORMAL
NITRITE, URINE: NEGATIVE
PH UA: 6 (ref 5–9)
PROTEIN UA: NEGATIVE
RBC UA: ABNORMAL /HPF (ref 0–2)
SPECIFIC GRAVITY UA: 1.02 (ref 1.01–1.02)
TURBIDITY: ABNORMAL
URINE HGB: ABNORMAL
UROBILINOGEN, URINE: NORMAL
WBC UA: ABNORMAL /HPF (ref 0–5)

## 2022-05-28 PROCEDURE — 81001 URINALYSIS AUTO W/SCOPE: CPT

## 2022-05-28 PROCEDURE — 87086 URINE CULTURE/COLONY COUNT: CPT

## 2022-05-29 LAB
CULTURE: NORMAL
SPECIMEN DESCRIPTION: NORMAL

## 2022-05-31 ENCOUNTER — HOSPITAL ENCOUNTER (OUTPATIENT)
Age: 72
Setting detail: SPECIMEN
Discharge: HOME | End: 2022-05-31
Payer: MEDICARE

## 2022-05-31 LAB
ABSOLUTE EOS #: 0.03 K/UL (ref 0–0.44)
ABSOLUTE IMMATURE GRANULOCYTE: 0.04 K/UL (ref 0–0.3)
ABSOLUTE LYMPH #: 1.35 K/UL (ref 1.1–3.7)
ABSOLUTE MONO #: 0.91 K/UL (ref 0.1–1.2)
ALBUMIN SERPL-MCNC: 4.1 G/DL (ref 3.5–5.2)
ALBUMIN/GLOBULIN RATIO: 1.3 (ref 1–2.5)
ALP BLD-CCNC: 73 U/L (ref 35–104)
ALT SERPL-CCNC: 15 U/L (ref 5–33)
ANION GAP SERPL CALCULATED.3IONS-SCNC: 10 MMOL/L (ref 9–17)
AST SERPL-CCNC: 15 U/L
BASOPHILS # BLD: 0 % (ref 0–2)
BASOPHILS ABSOLUTE: <0.03 K/UL (ref 0–0.2)
BILIRUB SERPL-MCNC: 0.58 MG/DL (ref 0.3–1.2)
BUN BLDV-MCNC: 18 MG/DL (ref 8–23)
BUN/CREAT BLD: 20 (ref 9–20)
CALCIUM SERPL-MCNC: 9.5 MG/DL (ref 8.6–10.4)
CHLORIDE BLD-SCNC: 106 MMOL/L (ref 98–107)
CO2: 27 MMOL/L (ref 20–31)
CREAT SERPL-MCNC: 0.89 MG/DL (ref 0.5–0.9)
EOSINOPHILS RELATIVE PERCENT: 1 % (ref 1–4)
GFR AFRICAN AMERICAN: >60 ML/MIN
GFR NON-AFRICAN AMERICAN: >60 ML/MIN
GFR SERPL CREATININE-BSD FRML MDRD: NORMAL ML/MIN/{1.73_M2}
GFR SERPL CREATININE-BSD FRML MDRD: NORMAL ML/MIN/{1.73_M2}
GLUCOSE BLD-MCNC: 91 MG/DL (ref 70–99)
HCT VFR BLD CALC: 41.3 % (ref 36.3–47.1)
HEMOGLOBIN: 13.6 G/DL (ref 11.9–15.1)
IMMATURE GRANULOCYTES: 1 %
LYMPHOCYTES # BLD: 24 % (ref 24–43)
MCH RBC QN AUTO: 30.9 PG (ref 25.2–33.5)
MCHC RBC AUTO-ENTMCNC: 32.9 G/DL (ref 28.4–34.8)
MCV RBC AUTO: 93.9 FL (ref 82.6–102.9)
MONOCYTES # BLD: 16 % (ref 3–12)
NRBC AUTOMATED: 0 PER 100 WBC
PDW BLD-RTO: 12.8 % (ref 11.8–14.4)
PLATELET # BLD: 179 K/UL (ref 138–453)
PMV BLD AUTO: 11.8 FL (ref 8.1–13.5)
POTASSIUM SERPL-SCNC: 4 MMOL/L (ref 3.7–5.3)
RBC # BLD: 4.4 M/UL (ref 3.95–5.11)
SEG NEUTROPHILS: 58 % (ref 36–65)
SEGMENTED NEUTROPHILS ABSOLUTE COUNT: 3.27 K/UL (ref 1.5–8.1)
SODIUM BLD-SCNC: 143 MMOL/L (ref 135–144)
TOTAL PROTEIN: 7.2 G/DL (ref 6.4–8.3)
WBC # BLD: 5.6 K/UL (ref 3.5–11.3)

## 2022-05-31 PROCEDURE — 85025 COMPLETE CBC W/AUTO DIFF WBC: CPT

## 2022-05-31 PROCEDURE — 80053 COMPREHEN METABOLIC PANEL: CPT

## 2022-06-08 ENCOUNTER — HOSPITAL ENCOUNTER (OUTPATIENT)
Age: 72
Setting detail: SPECIMEN
Discharge: HOME | End: 2022-06-08

## 2022-06-09 ENCOUNTER — HOSPITAL ENCOUNTER (OUTPATIENT)
Age: 72
Setting detail: SPECIMEN
Discharge: HOME | End: 2022-06-09
Payer: MEDICARE

## 2022-06-09 LAB — TSH SERPL DL<=0.05 MIU/L-ACNC: 0.11 UIU/ML (ref 0.3–5)

## 2022-06-09 PROCEDURE — 36415 COLL VENOUS BLD VENIPUNCTURE: CPT

## 2022-06-09 PROCEDURE — 84443 ASSAY THYROID STIM HORMONE: CPT

## 2022-06-09 PROCEDURE — P9604 ONE-WAY ALLOW PRORATED TRIP: HCPCS

## 2022-09-06 ENCOUNTER — HOSPITAL ENCOUNTER (OUTPATIENT)
Age: 72
Setting detail: SPECIMEN
Discharge: HOME | End: 2022-09-06
Payer: MEDICARE

## 2022-09-06 LAB — TSH SERPL DL<=0.05 MIU/L-ACNC: 0.27 UIU/ML (ref 0.3–5)

## 2022-09-06 PROCEDURE — P9603 ONE-WAY ALLOW PRORATED MILES: HCPCS

## 2022-09-06 PROCEDURE — 36415 COLL VENOUS BLD VENIPUNCTURE: CPT

## 2022-09-06 PROCEDURE — 84443 ASSAY THYROID STIM HORMONE: CPT

## 2023-01-12 ENCOUNTER — HOSPITAL ENCOUNTER (OUTPATIENT)
Age: 73
Setting detail: SPECIMEN
Discharge: HOME | End: 2023-01-12
Payer: MEDICARE

## 2023-01-12 LAB
ESTIMATED AVERAGE GLUCOSE: 105 MG/DL
HBA1C MFR BLD: 5.3 % (ref 4–6)

## 2023-01-12 PROCEDURE — P9603 ONE-WAY ALLOW PRORATED MILES: HCPCS

## 2023-01-12 PROCEDURE — 83036 HEMOGLOBIN GLYCOSYLATED A1C: CPT

## 2023-01-12 PROCEDURE — 36415 COLL VENOUS BLD VENIPUNCTURE: CPT

## 2023-01-15 ENCOUNTER — HOSPITAL ENCOUNTER (OUTPATIENT)
Age: 73
Setting detail: SPECIMEN
Discharge: HOME | End: 2023-01-15
Payer: MEDICARE

## 2023-01-15 LAB
BACTERIA: ABNORMAL
BILIRUBIN URINE: NEGATIVE
COLOR: YELLOW
CRYSTALS, UA: ABNORMAL /HPF
EPITHELIAL CELLS UA: ABNORMAL /HPF (ref 0–25)
GLUCOSE URINE: NEGATIVE
KETONES, URINE: NEGATIVE
LEUKOCYTE ESTERASE, URINE: ABNORMAL
NITRITE, URINE: POSITIVE
PH UA: 5.5 (ref 5–9)
PROTEIN UA: NEGATIVE
RBC UA: ABNORMAL /HPF (ref 0–2)
SPECIFIC GRAVITY UA: >1.03 (ref 1.01–1.02)
TURBIDITY: ABNORMAL
URINE HGB: ABNORMAL
UROBILINOGEN, URINE: NORMAL
WBC UA: ABNORMAL /HPF (ref 0–5)

## 2023-01-15 PROCEDURE — 87086 URINE CULTURE/COLONY COUNT: CPT

## 2023-01-15 PROCEDURE — 81001 URINALYSIS AUTO W/SCOPE: CPT

## 2023-01-15 PROCEDURE — 87186 SC STD MICRODIL/AGAR DIL: CPT

## 2023-01-15 PROCEDURE — 87088 URINE BACTERIA CULTURE: CPT

## 2023-01-17 LAB
CULTURE: ABNORMAL
SPECIMEN DESCRIPTION: ABNORMAL

## 2023-02-02 ENCOUNTER — HOSPITAL ENCOUNTER (OUTPATIENT)
Age: 73
Setting detail: SPECIMEN
Discharge: HOME | End: 2023-02-02
Payer: MEDICARE

## 2023-02-02 LAB
ABSOLUTE EOS #: 0.03 K/UL (ref 0–0.44)
ABSOLUTE IMMATURE GRANULOCYTE: 0.04 K/UL (ref 0–0.3)
ABSOLUTE LYMPH #: 1.44 K/UL (ref 1.1–3.7)
ABSOLUTE MONO #: 1.49 K/UL (ref 0.1–1.2)
ANION GAP SERPL CALCULATED.3IONS-SCNC: 11 MMOL/L (ref 9–17)
BASOPHILS # BLD: 0 % (ref 0–2)
BASOPHILS ABSOLUTE: <0.03 K/UL (ref 0–0.2)
BUN SERPL-MCNC: 19 MG/DL (ref 8–23)
BUN/CREAT BLD: 19 (ref 9–20)
CALCIUM SERPL-MCNC: 9.3 MG/DL (ref 8.6–10.4)
CHLORIDE SERPL-SCNC: 103 MMOL/L (ref 98–107)
CO2 SERPL-SCNC: 29 MMOL/L (ref 20–31)
CREAT SERPL-MCNC: 1.02 MG/DL (ref 0.5–0.9)
EOSINOPHILS RELATIVE PERCENT: 0 % (ref 1–4)
GFR SERPL CREATININE-BSD FRML MDRD: 58 ML/MIN/1.73M2
GLUCOSE SERPL-MCNC: 77 MG/DL (ref 70–99)
HCT VFR BLD AUTO: 40.4 % (ref 36.3–47.1)
HGB BLD-MCNC: 13.2 G/DL (ref 11.9–15.1)
IMMATURE GRANULOCYTES: 1 %
LYMPHOCYTES # BLD: 19 % (ref 24–43)
MCH RBC QN AUTO: 30.1 PG (ref 25.2–33.5)
MCHC RBC AUTO-ENTMCNC: 32.7 G/DL (ref 28.4–34.8)
MCV RBC AUTO: 92.2 FL (ref 82.6–102.9)
MONOCYTES # BLD: 20 % (ref 3–12)
NRBC AUTOMATED: 0 PER 100 WBC
PDW BLD-RTO: 14.1 % (ref 11.8–14.4)
PLATELET # BLD AUTO: 180 K/UL (ref 138–453)
PMV BLD AUTO: 12.7 FL (ref 8.1–13.5)
POTASSIUM SERPL-SCNC: 3.1 MMOL/L (ref 3.7–5.3)
RBC # BLD: 4.38 M/UL (ref 3.95–5.11)
SEG NEUTROPHILS: 60 % (ref 36–65)
SEGMENTED NEUTROPHILS ABSOLUTE COUNT: 4.64 K/UL (ref 1.5–8.1)
SODIUM SERPL-SCNC: 143 MMOL/L (ref 135–144)
WBC # BLD AUTO: 7.7 K/UL (ref 3.5–11.3)

## 2023-02-02 PROCEDURE — 80048 BASIC METABOLIC PNL TOTAL CA: CPT

## 2023-02-02 PROCEDURE — 36415 COLL VENOUS BLD VENIPUNCTURE: CPT

## 2023-02-02 PROCEDURE — P9603 ONE-WAY ALLOW PRORATED MILES: HCPCS

## 2023-02-02 PROCEDURE — 85025 COMPLETE CBC W/AUTO DIFF WBC: CPT

## 2023-02-09 ENCOUNTER — HOSPITAL ENCOUNTER (OUTPATIENT)
Age: 73
Setting detail: SPECIMEN
Discharge: HOME | End: 2023-02-09
Payer: MEDICARE

## 2023-02-09 LAB
ANION GAP SERPL CALCULATED.3IONS-SCNC: 9 MMOL/L (ref 9–17)
BUN SERPL-MCNC: 19 MG/DL (ref 8–23)
BUN/CREAT BLD: 22 (ref 9–20)
CALCIUM SERPL-MCNC: 8.7 MG/DL (ref 8.6–10.4)
CHLORIDE SERPL-SCNC: 107 MMOL/L (ref 98–107)
CO2 SERPL-SCNC: 26 MMOL/L (ref 20–31)
CREAT SERPL-MCNC: 0.88 MG/DL (ref 0.5–0.9)
GFR SERPL CREATININE-BSD FRML MDRD: >60 ML/MIN/1.73M2
GLUCOSE SERPL-MCNC: 68 MG/DL (ref 70–99)
POTASSIUM SERPL-SCNC: 3.6 MMOL/L (ref 3.7–5.3)
SODIUM SERPL-SCNC: 142 MMOL/L (ref 135–144)

## 2023-02-09 PROCEDURE — 36415 COLL VENOUS BLD VENIPUNCTURE: CPT

## 2023-02-09 PROCEDURE — 80048 BASIC METABOLIC PNL TOTAL CA: CPT

## 2023-02-09 PROCEDURE — P9603 ONE-WAY ALLOW PRORATED MILES: HCPCS

## 2023-03-02 ENCOUNTER — HOSPITAL ENCOUNTER (OUTPATIENT)
Age: 73
Setting detail: SPECIMEN
Discharge: HOME | End: 2023-03-02
Payer: MEDICARE

## 2023-03-02 LAB
ABSOLUTE EOS #: 0.09 K/UL (ref 0–0.44)
ABSOLUTE IMMATURE GRANULOCYTE: 0.06 K/UL (ref 0–0.3)
ABSOLUTE LYMPH #: 2.14 K/UL (ref 1.1–3.7)
ABSOLUTE MONO #: 1.29 K/UL (ref 0.1–1.2)
ALBUMIN SERPL-MCNC: 2.7 G/DL (ref 3.5–5.2)
ALBUMIN/GLOBULIN RATIO: 0.9 (ref 1–2.5)
ALP SERPL-CCNC: 95 U/L (ref 35–104)
ALT SERPL-CCNC: 33 U/L (ref 5–33)
ANION GAP SERPL CALCULATED.3IONS-SCNC: 7 MMOL/L (ref 9–17)
AST SERPL-CCNC: 34 U/L
BASOPHILS # BLD: 0 % (ref 0–2)
BASOPHILS ABSOLUTE: <0.03 K/UL (ref 0–0.2)
BILIRUB SERPL-MCNC: 0.4 MG/DL (ref 0.3–1.2)
BUN SERPL-MCNC: 17 MG/DL (ref 8–23)
BUN/CREAT BLD: 18 (ref 9–20)
CALCIUM SERPL-MCNC: 8.8 MG/DL (ref 8.6–10.4)
CHLORIDE SERPL-SCNC: 107 MMOL/L (ref 98–107)
CO2 SERPL-SCNC: 30 MMOL/L (ref 20–31)
CREAT SERPL-MCNC: 0.93 MG/DL (ref 0.5–0.9)
EOSINOPHILS RELATIVE PERCENT: 1 % (ref 1–4)
EST. AVERAGE GLUCOSE BLD GHB EST-MCNC: 97 MG/DL
GFR SERPL CREATININE-BSD FRML MDRD: >60 ML/MIN/1.73M2
GLUCOSE SERPL-MCNC: 73 MG/DL (ref 70–99)
HBA1C MFR BLD: 5 % (ref 4–6)
HCT VFR BLD AUTO: 37.8 % (ref 36.3–47.1)
HGB BLD-MCNC: 12.3 G/DL (ref 11.9–15.1)
IMMATURE GRANULOCYTES: 1 %
IRON SERPL-MCNC: 98 UG/DL (ref 37–145)
LYMPHOCYTES # BLD: 33 % (ref 24–43)
MCH RBC QN AUTO: 30.8 PG (ref 25.2–33.5)
MCHC RBC AUTO-ENTMCNC: 32.5 G/DL (ref 28.4–34.8)
MCV RBC AUTO: 94.5 FL (ref 82.6–102.9)
MONOCYTES # BLD: 20 % (ref 3–12)
NRBC AUTOMATED: 0 PER 100 WBC
PDW BLD-RTO: 16.1 % (ref 11.8–14.4)
PLATELET # BLD AUTO: 175 K/UL (ref 138–453)
PMV BLD AUTO: 12.5 FL (ref 8.1–13.5)
POTASSIUM SERPL-SCNC: 3.6 MMOL/L (ref 3.7–5.3)
PROT SERPL-MCNC: 5.6 G/DL (ref 6.4–8.3)
RBC # BLD: 4 M/UL (ref 3.95–5.11)
SEG NEUTROPHILS: 45 % (ref 36–65)
SEGMENTED NEUTROPHILS ABSOLUTE COUNT: 2.83 K/UL (ref 1.5–8.1)
SODIUM SERPL-SCNC: 144 MMOL/L (ref 135–144)
VIT B12 SERPL-MCNC: 635 PG/ML (ref 232–1245)
WBC # BLD AUTO: 6.4 K/UL (ref 3.5–11.3)

## 2023-03-02 PROCEDURE — P9603 ONE-WAY ALLOW PRORATED MILES: HCPCS

## 2023-03-02 PROCEDURE — 83540 ASSAY OF IRON: CPT

## 2023-03-02 PROCEDURE — 83036 HEMOGLOBIN GLYCOSYLATED A1C: CPT

## 2023-03-02 PROCEDURE — 36415 COLL VENOUS BLD VENIPUNCTURE: CPT

## 2023-03-02 PROCEDURE — 80053 COMPREHEN METABOLIC PANEL: CPT

## 2023-03-02 PROCEDURE — 85025 COMPLETE CBC W/AUTO DIFF WBC: CPT

## 2023-03-02 PROCEDURE — 82607 VITAMIN B-12: CPT

## 2023-03-20 ENCOUNTER — HOSPITAL ENCOUNTER (OUTPATIENT)
Age: 73
Setting detail: SPECIMEN
Discharge: HOME | End: 2023-03-20
Payer: MEDICARE

## 2023-03-20 LAB
IRON SERPL-MCNC: 76 UG/DL (ref 37–145)
VIT B12 SERPL-MCNC: 565 PG/ML (ref 232–1245)

## 2023-03-20 PROCEDURE — 83540 ASSAY OF IRON: CPT

## 2023-03-20 PROCEDURE — P9604 ONE-WAY ALLOW PRORATED TRIP: HCPCS

## 2023-03-20 PROCEDURE — 82607 VITAMIN B-12: CPT

## 2023-04-05 ENCOUNTER — HOSPITAL ENCOUNTER (OUTPATIENT)
Age: 73
Setting detail: SPECIMEN
Discharge: HOME | End: 2023-04-05
Payer: MEDICARE

## 2023-04-05 LAB — 25(OH)D3 SERPL-MCNC: 21 NG/ML

## 2023-04-05 PROCEDURE — P9604 ONE-WAY ALLOW PRORATED TRIP: HCPCS

## 2023-04-05 PROCEDURE — 82306 VITAMIN D 25 HYDROXY: CPT

## 2023-04-05 PROCEDURE — 36415 COLL VENOUS BLD VENIPUNCTURE: CPT

## 2023-07-19 ENCOUNTER — HOSPITAL ENCOUNTER (OUTPATIENT)
Age: 73
Setting detail: SPECIMEN
Discharge: HOME | End: 2023-07-19
Payer: MEDICARE

## 2023-07-19 LAB
BACTERIA URNS QL MICRO: ABNORMAL
BILIRUB UR QL STRIP: ABNORMAL
CLARITY UR: CLEAR
COLOR UR: YELLOW
CRYSTALS URNS MICRO: ABNORMAL /HPF
EPI CELLS #/AREA URNS HPF: ABNORMAL /HPF (ref 0–25)
GLUCOSE UR STRIP-MCNC: NEGATIVE MG/DL
HGB UR QL STRIP.AUTO: NEGATIVE
KETONES UR STRIP-MCNC: NEGATIVE MG/DL
LEUKOCYTE ESTERASE UR QL STRIP: ABNORMAL
NITRITE UR QL STRIP: NEGATIVE
PH UR STRIP: 5.5 [PH] (ref 5–9)
PROT UR STRIP-MCNC: NEGATIVE MG/DL
RBC #/AREA URNS HPF: ABNORMAL /HPF (ref 0–2)
SP GR UR STRIP: 1.02 (ref 1.01–1.02)
UROBILINOGEN UR STRIP-ACNC: NORMAL EU/DL (ref 0–1)
WBC #/AREA URNS HPF: ABNORMAL /HPF (ref 0–5)

## 2023-07-19 PROCEDURE — 87086 URINE CULTURE/COLONY COUNT: CPT

## 2023-07-19 PROCEDURE — 81001 URINALYSIS AUTO W/SCOPE: CPT

## 2023-07-20 LAB
MICROORGANISM SPEC CULT: NORMAL
SPECIMEN DESCRIPTION: NORMAL

## 2023-07-25 ENCOUNTER — HOSPITAL ENCOUNTER (OUTPATIENT)
Age: 73
Setting detail: SPECIMEN
Discharge: HOME | End: 2023-07-25
Payer: MEDICARE

## 2023-07-25 PROCEDURE — 87186 SC STD MICRODIL/AGAR DIL: CPT

## 2023-07-25 PROCEDURE — 87077 CULTURE AEROBIC IDENTIFY: CPT

## 2023-07-25 PROCEDURE — 87088 URINE BACTERIA CULTURE: CPT

## 2023-07-25 PROCEDURE — 81001 URINALYSIS AUTO W/SCOPE: CPT

## 2023-07-25 PROCEDURE — 87086 URINE CULTURE/COLONY COUNT: CPT

## 2023-07-26 LAB
BACTERIA URNS QL MICRO: ABNORMAL
BILIRUB UR QL STRIP: NEGATIVE
CLARITY UR: CLEAR
COLOR UR: YELLOW
EPI CELLS #/AREA URNS HPF: ABNORMAL /HPF (ref 0–25)
GLUCOSE UR STRIP-MCNC: NEGATIVE MG/DL
HGB UR QL STRIP.AUTO: NEGATIVE
KETONES UR STRIP-MCNC: NEGATIVE MG/DL
LEUKOCYTE ESTERASE UR QL STRIP: ABNORMAL
MUCOUS THREADS URNS QL MICRO: ABNORMAL
NITRITE UR QL STRIP: POSITIVE
PH UR STRIP: 6 [PH] (ref 5–9)
PROT UR STRIP-MCNC: NEGATIVE MG/DL
RBC #/AREA URNS HPF: ABNORMAL /HPF (ref 0–2)
SP GR UR STRIP: >1.03 (ref 1.01–1.02)
UROBILINOGEN UR STRIP-ACNC: NORMAL EU/DL (ref 0–1)
WBC #/AREA URNS HPF: ABNORMAL /HPF (ref 0–5)

## 2023-07-28 LAB
MICROORGANISM SPEC CULT: ABNORMAL
MICROORGANISM SPEC CULT: ABNORMAL
SPECIMEN DESCRIPTION: ABNORMAL

## 2023-09-13 ENCOUNTER — HOSPITAL ENCOUNTER (OUTPATIENT)
Age: 73
Setting detail: SPECIMEN
Discharge: HOME | End: 2023-09-13
Payer: MEDICARE

## 2023-09-13 LAB
EST. AVERAGE GLUCOSE BLD GHB EST-MCNC: 100 MG/DL
HBA1C MFR BLD: 5.1 % (ref 4–6)

## 2023-09-13 PROCEDURE — 36415 COLL VENOUS BLD VENIPUNCTURE: CPT

## 2023-09-13 PROCEDURE — P9603 ONE-WAY ALLOW PRORATED MILES: HCPCS

## 2023-09-13 PROCEDURE — 83036 HEMOGLOBIN GLYCOSYLATED A1C: CPT

## 2024-01-08 ENCOUNTER — HOSPITAL ENCOUNTER (OUTPATIENT)
Age: 74
Setting detail: SPECIMEN
Discharge: HOME | End: 2024-01-08
Payer: MEDICARE

## 2024-01-08 LAB
ALBUMIN SERPL-MCNC: 3.6 G/DL (ref 3.5–5.2)
ALBUMIN/GLOB SERPL: 1.1 {RATIO} (ref 1–2.5)
ALP SERPL-CCNC: 247 U/L (ref 35–104)
ALT SERPL-CCNC: 25 U/L (ref 5–33)
ANION GAP SERPL CALCULATED.3IONS-SCNC: 7 MMOL/L (ref 9–17)
AST SERPL-CCNC: 15 U/L
BILIRUB SERPL-MCNC: 0.4 MG/DL (ref 0.3–1.2)
BUN SERPL-MCNC: 21 MG/DL (ref 8–23)
BUN/CREAT SERPL: 21 (ref 9–20)
CALCIUM SERPL-MCNC: 9.9 MG/DL (ref 8.6–10.4)
CHLORIDE SERPL-SCNC: 103 MMOL/L (ref 98–107)
CHOLEST SERPL-MCNC: 131 MG/DL (ref 0–199)
CHOLESTEROL/HDL RATIO: 4
CO2 SERPL-SCNC: 32 MMOL/L (ref 20–31)
CREAT SERPL-MCNC: 1 MG/DL (ref 0.5–0.9)
ERYTHROCYTE [DISTWIDTH] IN BLOOD BY AUTOMATED COUNT: 13.1 % (ref 11.8–14.4)
GFR SERPL CREATININE-BSD FRML MDRD: 59 ML/MIN/1.73M2
GLUCOSE SERPL-MCNC: 72 MG/DL (ref 70–99)
HCT VFR BLD AUTO: 42.7 % (ref 36.3–47.1)
HDLC SERPL-MCNC: 34 MG/DL
HGB BLD-MCNC: 14 G/DL (ref 11.9–15.1)
LDLC SERPL CALC-MCNC: 73 MG/DL (ref 0–100)
MCH RBC QN AUTO: 31.6 PG (ref 25.2–33.5)
MCHC RBC AUTO-ENTMCNC: 32.8 G/DL (ref 28.4–34.8)
MCV RBC AUTO: 96.4 FL (ref 82.6–102.9)
NRBC BLD-RTO: 0 PER 100 WBC
PLATELET # BLD AUTO: 243 K/UL (ref 138–453)
PMV BLD AUTO: 12.1 FL (ref 8.1–13.5)
POTASSIUM SERPL-SCNC: 3.9 MMOL/L (ref 3.7–5.3)
PROT SERPL-MCNC: 6.8 G/DL (ref 6.4–8.3)
RBC # BLD AUTO: 4.43 M/UL (ref 3.95–5.11)
SODIUM SERPL-SCNC: 142 MMOL/L (ref 135–144)
TRIGL SERPL-MCNC: 119 MG/DL
VLDLC SERPL CALC-MCNC: 24 MG/DL
WBC OTHER # BLD: 7 K/UL (ref 3.5–11.3)

## 2024-01-08 PROCEDURE — 80061 LIPID PANEL: CPT

## 2024-01-08 PROCEDURE — 80053 COMPREHEN METABOLIC PANEL: CPT

## 2024-01-08 PROCEDURE — 85027 COMPLETE CBC AUTOMATED: CPT

## 2024-01-08 PROCEDURE — P9603 ONE-WAY ALLOW PRORATED MILES: HCPCS

## 2024-01-08 PROCEDURE — 36415 COLL VENOUS BLD VENIPUNCTURE: CPT

## 2024-02-05 PROBLEM — G30.9 MILD ALZHEIMER'S DEMENTIA WITHOUT BEHAVIORAL DISTURBANCE, PSYCHOTIC DISTURBANCE, MOOD DISTURBANCE, OR ANXIETY, UNSPECIFIED TIMING OF DEMENTIA ONSET (HCC): Status: ACTIVE | Noted: 2024-02-05

## 2024-02-05 PROBLEM — F02.A0 MILD ALZHEIMER'S DEMENTIA WITHOUT BEHAVIORAL DISTURBANCE, PSYCHOTIC DISTURBANCE, MOOD DISTURBANCE, OR ANXIETY, UNSPECIFIED TIMING OF DEMENTIA ONSET (HCC): Status: ACTIVE | Noted: 2024-02-05

## 2024-02-12 ENCOUNTER — HOSPITAL ENCOUNTER (OUTPATIENT)
Age: 74
Setting detail: SPECIMEN
Discharge: HOME | End: 2024-02-12
Payer: MEDICARE

## 2024-02-12 LAB
25(OH)D3 SERPL-MCNC: 27.4 NG/ML (ref 30–100)
CALCIUM SERPL-MCNC: 8.9 MG/DL (ref 8.6–10.4)

## 2024-02-12 PROCEDURE — 36415 COLL VENOUS BLD VENIPUNCTURE: CPT

## 2024-02-12 PROCEDURE — P9603 ONE-WAY ALLOW PRORATED MILES: HCPCS

## 2024-02-12 PROCEDURE — 82310 ASSAY OF CALCIUM: CPT

## 2024-02-12 PROCEDURE — 82306 VITAMIN D 25 HYDROXY: CPT

## 2024-02-18 ENCOUNTER — HOSPITAL ENCOUNTER (OUTPATIENT)
Age: 74
Setting detail: SPECIMEN
Discharge: HOME | End: 2024-02-18
Payer: MEDICARE

## 2024-02-18 PROCEDURE — 82272 OCCULT BLD FECES 1-3 TESTS: CPT

## 2024-02-19 LAB
DATE, STOOL #1: NORMAL
HEMOCCULT SP1 STL QL: NEGATIVE
TIME, STOOL #1: 830

## 2024-03-13 ENCOUNTER — HOSPITAL ENCOUNTER (OUTPATIENT)
Age: 74
Setting detail: SPECIMEN
Discharge: HOME | End: 2024-03-13
Payer: MEDICARE

## 2024-03-13 LAB
25(OH)D3 SERPL-MCNC: 27.6 NG/ML (ref 30–100)
ALBUMIN SERPL-MCNC: 3.2 G/DL (ref 3.5–5.2)
ALBUMIN/GLOB SERPL: 1 {RATIO} (ref 1–2.5)
ALP SERPL-CCNC: 138 U/L (ref 35–104)
ALT SERPL-CCNC: 17 U/L (ref 5–33)
ANION GAP SERPL CALCULATED.3IONS-SCNC: 10 MMOL/L (ref 9–17)
AST SERPL-CCNC: 20 U/L
BILIRUB SERPL-MCNC: 0.5 MG/DL (ref 0.3–1.2)
BUN SERPL-MCNC: 21 MG/DL (ref 8–23)
BUN/CREAT SERPL: 21 (ref 9–20)
CALCIUM SERPL-MCNC: 8.9 MG/DL (ref 8.6–10.4)
CHLORIDE SERPL-SCNC: 102 MMOL/L (ref 98–107)
CHOLEST SERPL-MCNC: 109 MG/DL (ref 0–199)
CHOLESTEROL/HDL RATIO: 2
CO2 SERPL-SCNC: 27 MMOL/L (ref 20–31)
CREAT SERPL-MCNC: 1 MG/DL (ref 0.5–0.9)
ERYTHROCYTE [DISTWIDTH] IN BLOOD BY AUTOMATED COUNT: 15.2 % (ref 11.8–14.4)
EST. AVERAGE GLUCOSE BLD GHB EST-MCNC: 94 MG/DL
GFR SERPL CREATININE-BSD FRML MDRD: 59 ML/MIN/1.73M2
GLUCOSE SERPL-MCNC: 62 MG/DL (ref 70–99)
HBA1C MFR BLD: 4.9 % (ref 4–6)
HCT VFR BLD AUTO: 38.3 % (ref 36.3–47.1)
HDLC SERPL-MCNC: 46 MG/DL
HGB BLD-MCNC: 12.8 G/DL (ref 11.9–15.1)
LDLC SERPL CALC-MCNC: 45 MG/DL (ref 0–100)
MCH RBC QN AUTO: 31.9 PG (ref 25.2–33.5)
MCHC RBC AUTO-ENTMCNC: 33.4 G/DL (ref 28.4–34.8)
MCV RBC AUTO: 95.5 FL (ref 82.6–102.9)
NRBC BLD-RTO: 0 PER 100 WBC
PLATELET # BLD AUTO: 221 K/UL (ref 138–453)
PMV BLD AUTO: 11.7 FL (ref 8.1–13.5)
POTASSIUM SERPL-SCNC: 3.9 MMOL/L (ref 3.7–5.3)
PROT SERPL-MCNC: 6.4 G/DL (ref 6.4–8.3)
RBC # BLD AUTO: 4.01 M/UL (ref 3.95–5.11)
SODIUM SERPL-SCNC: 139 MMOL/L (ref 135–144)
TRIGL SERPL-MCNC: 91 MG/DL
VLDLC SERPL CALC-MCNC: 18 MG/DL
WBC OTHER # BLD: 6.7 K/UL (ref 3.5–11.3)

## 2024-03-13 PROCEDURE — 80061 LIPID PANEL: CPT

## 2024-03-13 PROCEDURE — 36415 COLL VENOUS BLD VENIPUNCTURE: CPT

## 2024-03-13 PROCEDURE — P9604 ONE-WAY ALLOW PRORATED TRIP: HCPCS

## 2024-03-13 PROCEDURE — 83036 HEMOGLOBIN GLYCOSYLATED A1C: CPT

## 2024-03-13 PROCEDURE — 82306 VITAMIN D 25 HYDROXY: CPT

## 2024-03-13 PROCEDURE — 80053 COMPREHEN METABOLIC PANEL: CPT

## 2024-03-13 PROCEDURE — 85027 COMPLETE CBC AUTOMATED: CPT

## 2024-03-13 PROCEDURE — 80175 DRUG SCREEN QUAN LAMOTRIGINE: CPT

## 2024-03-15 LAB — LAMOTRIGINE SERPL-MCNC: 4.2 UG/ML (ref 3–15)

## 2024-03-31 PROBLEM — E55.9 VITAMIN D DEFICIENCY: Status: ACTIVE | Noted: 2024-03-31

## 2024-04-17 ENCOUNTER — HOSPITAL ENCOUNTER (OUTPATIENT)
Age: 74
Setting detail: SPECIMEN
Discharge: HOME | End: 2024-04-17
Payer: MEDICARE

## 2024-04-17 LAB — TSH SERPL DL<=0.05 MIU/L-ACNC: 0.69 UIU/ML (ref 0.3–5)

## 2024-04-17 PROCEDURE — P9603 ONE-WAY ALLOW PRORATED MILES: HCPCS

## 2024-04-17 PROCEDURE — 36415 COLL VENOUS BLD VENIPUNCTURE: CPT

## 2024-04-17 PROCEDURE — 84443 ASSAY THYROID STIM HORMONE: CPT
